# Patient Record
Sex: MALE | Race: WHITE | URBAN - METROPOLITAN AREA
[De-identification: names, ages, dates, MRNs, and addresses within clinical notes are randomized per-mention and may not be internally consistent; named-entity substitution may affect disease eponyms.]

---

## 2021-10-01 ENCOUNTER — TELEPHONE (OUTPATIENT)
Dept: FAMILY MEDICINE CLINIC | Age: 66
End: 2021-10-01

## 2021-10-01 NOTE — TELEPHONE ENCOUNTER
----- Message from Nel Jasiel sent at 9/30/2021 10:08 AM EDT -----  Subject: Message to Provider    QUESTIONS  Information for Provider? Patient has scheduled a new patient appt for   10/18 at 2:30. He would like to have bloodwork done before the appt so   that it can be discussed. Please call patient to advise.   ---------------------------------------------------------------------------  --------------  CALL BACK INFO  What is the best way for the office to contact you? OK to leave message on   voicemail  Preferred Call Back Phone Number? [phone]  ---------------------------------------------------------------------------  --------------  SCRIPT ANSWERS  Relationship to Patient?  Self

## 2021-10-04 DIAGNOSIS — Z00.00 PREVENTATIVE HEALTH CARE: Primary | ICD-10-CM

## 2021-10-04 DIAGNOSIS — Z13.29 SCREENING FOR THYROID DISORDER: ICD-10-CM

## 2021-10-04 DIAGNOSIS — Z12.5 SCREENING FOR MALIGNANT NEOPLASM OF PROSTATE: ICD-10-CM

## 2021-10-18 ENCOUNTER — OFFICE VISIT (OUTPATIENT)
Dept: FAMILY MEDICINE CLINIC | Age: 66
End: 2021-10-18
Payer: COMMERCIAL

## 2021-10-18 VITALS
WEIGHT: 226.8 LBS | BODY MASS INDEX: 30.06 KG/M2 | TEMPERATURE: 98.6 F | HEIGHT: 73 IN | HEART RATE: 82 BPM | SYSTOLIC BLOOD PRESSURE: 152 MMHG | OXYGEN SATURATION: 97 % | DIASTOLIC BLOOD PRESSURE: 102 MMHG

## 2021-10-18 DIAGNOSIS — E78.2 MIXED HYPERLIPIDEMIA: ICD-10-CM

## 2021-10-18 DIAGNOSIS — Z00.00 PREVENTATIVE HEALTH CARE: Primary | ICD-10-CM

## 2021-10-18 DIAGNOSIS — Z23 NEED FOR VACCINATION: ICD-10-CM

## 2021-10-18 DIAGNOSIS — I10 ESSENTIAL HYPERTENSION: ICD-10-CM

## 2021-10-18 PROCEDURE — 90756 CCIIV4 VACC ABX FREE IM: CPT | Performed by: NURSE PRACTITIONER

## 2021-10-18 PROCEDURE — 99386 PREV VISIT NEW AGE 40-64: CPT | Performed by: NURSE PRACTITIONER

## 2021-10-18 PROCEDURE — 90471 IMMUNIZATION ADMIN: CPT | Performed by: NURSE PRACTITIONER

## 2021-10-18 RX ORDER — LOSARTAN POTASSIUM 25 MG/1
25 TABLET ORAL DAILY
Qty: 30 TABLET | Refills: 0 | Status: SHIPPED | OUTPATIENT
Start: 2021-10-18 | End: 2021-11-18 | Stop reason: SDUPTHER

## 2021-10-18 ASSESSMENT — PATIENT HEALTH QUESTIONNAIRE - PHQ9
SUM OF ALL RESPONSES TO PHQ QUESTIONS 1-9: 0
SUM OF ALL RESPONSES TO PHQ QUESTIONS 1-9: 0
SUM OF ALL RESPONSES TO PHQ9 QUESTIONS 1 & 2: 0
SUM OF ALL RESPONSES TO PHQ QUESTIONS 1-9: 0
1. LITTLE INTEREST OR PLEASURE IN DOING THINGS: 0
2. FEELING DOWN, DEPRESSED OR HOPELESS: 0

## 2021-10-18 NOTE — PROGRESS NOTES
Elder Mckinley  : 1955  Encounter date: 10/18/2021    This jen 61 y.o. male who presents with  Chief Complaint   Patient presents with    New Patient     establish care       History of present illness:    HPI   History and Physical      Elder Mckinley  YOB: 1955    Date of Service:  10/18/2021    Chief Complaint:   Elder Mckinley is a 61 y.o. male who  presents for physical examination. HPI: PT is 61year old male, new to practice, labs completed 10/12/21 shows low HDL- 29 and high LDL- 142. Pt with history of essential hypertension has taken lisinopril in past.  Reports has taken medication in past with hypotension. Reports recently much more stressed. Wt Readings from Last 3 Encounters:   10/18/21 226 lb 12.8 oz (102.9 kg)     BP Readings from Last 3 Encounters:   10/18/21 (!) 152/102       Patient Active Problem List   Diagnosis    Mixed hyperlipidemia       No Known Allergies  Outpatient Medications Marked as Taking for the 10/18/21 encounter (Office Visit) with LACHO Childress NP   Medication Sig Dispense Refill    losartan (COZAAR) 25 MG tablet Take 1 tablet by mouth daily 30 tablet 0       History reviewed. No pertinent past medical history. History reviewed. No pertinent surgical history.   Family History   Problem Relation Age of Onset    Diabetes Mother      Social History     Socioeconomic History    Marital status:      Spouse name: Not on file    Number of children: Not on file    Years of education: Not on file    Highest education level: Not on file   Occupational History    Not on file   Tobacco Use    Smoking status: Never Smoker    Smokeless tobacco: Never Used   Substance and Sexual Activity    Alcohol use: Never    Drug use: Never    Sexual activity: Not on file   Other Topics Concern    Not on file   Social History Narrative    Not on file     Social Determinants of Health     Financial Resource Strain:     Difficulty of Paying Living Expenses:    Food Insecurity:     Worried About [facility] in the Last Year:     [address] in the Last Year:    Transportation Needs:     Lack of Transportation (Medical):  Lack of Transportation (Non-Medical):    Physical Activity:     Days of Exercise per Week:     Minutes of Exercise per Session:    Stress:     Feeling of Stress :    Social Connections:     Frequency of Communication with Friends and Family:     Frequency of Social Gatherings with Friends and Family:     Attends [employer] Services:     Active Member of Clubs or Organizations:     Attends Club or Organization Meetings:     Marital Status:    Intimate Partner Violence:     Fear of Current or Ex-Partner:     Emotionally Abused:     Physically Abused:     Sexually Abused:        Self-testicular exams: Yes  Sexual activity: none   Last eye exam: 2018, recommended  Exercise: no regular exercise    Review of Systems:  A comprehensive review of systems was negative except for what was noted in the HPI. Physical Exam:   Vitals:    10/18/21 1424 10/18/21 1456   BP: (!) 152/92 (!) 152/102   Site: Left Upper Arm    Position: Sitting    Cuff Size: Large Adult    Pulse: 82    Temp: 98.6 °F (37 °C)    SpO2: 97%    Weight: 226 lb 12.8 oz (102.9 kg)    Height: 6' 1\" (1.854 m)      Body mass index is 29.92 kg/m². Constitutional: He is oriented to person, place, and time. He appears well-developed and well-nourished. No distress. HEENT:   Head: Normocephalic and atraumatic. Right Ear: Tympanic membrane, external ear and ear canal normal.   Left Ear: Tympanic membrane, external ear and ear canal normal.   Mouth/Throat: Oropharynx is clear and moist and mucous membranes are normal. No oropharyngeal exudate or posterior oropharyngeal erythema. He has no cervical adenopathy. Eyes: Conjunctivae and extraocular motions are normal. Pupils are equal, round, and reactive to light. Neck:  Supple.  No JVD present. Carotid bruit is not present. No mass and no thyromegaly present. Cardiovascular: Normal rate, regular rhythm, normal heart sounds and intact distal pulses. Exam reveals no gallop and no friction rub. No murmur heard. Pulmonary/Chest: Effort normal and breath sounds normal. No respiratory distress. He has no wheezes, rhonchi or rales. Abdominal: Soft, non-tender. Bowel sounds and aorta are normal. There is no organomegaly, mass or bruit. Genitourinary:  rectal not indicated by age criteria and lack of symptoms. Musculoskeletal: Normal range of motion, no synovitis. He exhibits no edema. R ankle chronic pain, OA   Neurological: He is alert and oriented to person, place, and time. He has normal reflexes. No cranial nerve deficit. Coordination normal.   Skin: Skin is warm, dry and intact. No suspicious lesions are noted. Psychiatric: He has a normal mood and affect.  His speech is normal and behavior is normal. Judgment, cognition and memory are normal.     Preventive Care:  Health Maintenance   Topic Date Due    DTaP/Tdap/Td vaccine (1 - Tdap) Never done    Colon cancer screen colonoscopy  Never done    Potassium monitoring  10/12/2022    Creatinine monitoring  10/12/2022    Lipid screen  10/12/2026    Flu vaccine  Completed    Shingles Vaccine  Completed    COVID-19 Vaccine  Completed    Hepatitis C screen  Completed    HIV screen  Completed    Hepatitis A vaccine  Aged Out    Hepatitis B vaccine  Aged Out    Hib vaccine  Aged Out    Meningococcal (ACWY) vaccine  Aged Out    Pneumococcal 0-64 years Vaccine  Aged [employer] plan of care for Yokasta Mahmood        10/18/2021           Preventive Measures Status       Recommendations for screening   Prostate Cancer Screen  Lab Results   Component Value Date    PSA 1.0 10/12/2021      Repeat yearly    Colon Cancer Screen  Last colonoscopy: 09/09/2007 Test is due , pt will see which gastro is in network   Diabetes Screen  Glucose (mg/dL)   Date Value   10/12/2021 92    Repeat yearly   Cholesterol Screen  Lab Results   Component Value Date    CHOL 199 10/12/2021    TRIG 97 10/12/2021    HDL 39 (L) 10/12/2021    LDLCALC 142 (H) 10/12/2021    Repeat every 6 months   Aspirin for Cardiovascular Prevention   No Not indicated   Weight: Body mass index is 29.92 kg/m². 6' 1\" (1.854 m)226 lb 12.8 oz (102.9 kg)  Your BMI is 25 or greater, which indicates that you are overweight   Living Will: No recommended    Recommended Immunizations    Immunization History   Administered Date(s) Administered    COVID-19, Paul Peter, PF, 30mcg/0.3mL 03/14/2021, 04/03/2021    Influenza Virus Vaccine 09/16/2015, 09/25/2017, 10/30/2018, 10/31/2019, 10/24/2020    Influenza, MDCK Quadv, with preserv IM (Flucelvax 2 yrs and older) 10/18/2021    Zoster Recombinant (Shingrix) 10/30/2018, 02/12/2019    Influenza vaccine:  recommended every fall         Other Recommendations ·   Follow up in this office every 12 months for re-evaluation of your chronic medical issues             Assessment/Plan:  Adarsh Chaz was seen today for new patient. Diagnoses and all orders for this visit:    Preventative health care    Mixed hyperlipidemia    Need for vaccination  -     INFLUENZA, MDCK QUADV, 2 YRS AND OLDER, IM, MDV, 0.5ML (FLUCELVAX QUADV)    Essential hypertension  -     losartan (COZAAR) 25 MG tablet; Take 1 tablet by mouth daily            No current outpatient medications on file prior to visit. No current facility-administered medications on file prior to visit. No Known Allergies  History reviewed. No pertinent past medical history. History reviewed. No pertinent surgical history.    Family History   Problem Relation Age of Onset    Diabetes Mother       Social History     Tobacco Use    Smoking status: Never Smoker    Smokeless tobacco: Never Used   Substance Use Topics    Alcohol use: Never        Review of Systems    Objective:    BP (!) 152/102 Pulse 82   Temp 98.6 °F (37 °C)   Ht 6' 1\" (1.854 m)   Wt 226 lb 12.8 oz (102.9 kg)   SpO2 97%   BMI 29.92 kg/m²   Weight: 226 lb 12.8 oz (102.9 kg)     BP Readings from Last 3 Encounters:   10/18/21 (!) 152/102     Wt Readings from Last 3 Encounters:   10/18/21 226 lb 12.8 oz (102.9 kg)     BMI Readings from Last 3 Encounters:   10/18/21 29.92 kg/m²       Physical Exam    Assessment/Plan    1. Preventative health care  Advised routine dental and vision  Advised increased exercise and weight loss  Advised colonoscopy      2. Mixed hyperlipidemia  Discussed low saturated fat foods  Recheck in 6 months    3. Need for vaccination  Adminstered  - INFLUENZA, MDCK QUADV, 2 YRS AND OLDER, IM, MDV, 0.5ML (FLUCELVAX QUADV)    4. Essential hypertension  Advised daily monitoring and recording  Low salt diet  Stress reducing tactics  - losartan (COZAAR) 25 MG tablet; Take 1 tablet by mouth daily  Dispense: 30 tablet; Refill: 0      Return in about 1 month (around 11/18/2021) for hypertension re-check, medication re-check. This dictation was generated by voice recognition computer software. Although all attempts are made to edit the dictation for accuracy, there may be errors in the transcription that are not intended.

## 2021-11-18 ENCOUNTER — OFFICE VISIT (OUTPATIENT)
Dept: FAMILY MEDICINE CLINIC | Age: 66
End: 2021-11-18
Payer: COMMERCIAL

## 2021-11-18 VITALS
SYSTOLIC BLOOD PRESSURE: 130 MMHG | DIASTOLIC BLOOD PRESSURE: 82 MMHG | OXYGEN SATURATION: 98 % | HEART RATE: 67 BPM | WEIGHT: 226 LBS | BODY MASS INDEX: 29.82 KG/M2 | TEMPERATURE: 98.1 F

## 2021-11-18 DIAGNOSIS — E78.2 MIXED HYPERLIPIDEMIA: ICD-10-CM

## 2021-11-18 DIAGNOSIS — I10 ESSENTIAL HYPERTENSION: Primary | ICD-10-CM

## 2021-11-18 PROCEDURE — 99214 OFFICE O/P EST MOD 30 MIN: CPT | Performed by: NURSE PRACTITIONER

## 2021-11-18 RX ORDER — LOSARTAN POTASSIUM 25 MG/1
25 TABLET ORAL DAILY
Qty: 90 TABLET | Refills: 1 | Status: SHIPPED | OUTPATIENT
Start: 2021-11-18 | End: 2022-05-17

## 2021-11-18 NOTE — PROGRESS NOTES
Anderson Rodriguez  : 1955  Encounter date: 2021    This jen 61 y.o. male who presents with  Chief Complaint   Patient presents with    1 Month Follow-Up     BP medication       History of present illness:    HPI Pt is 61year old male for follow up on hypertension, provided BP measurement log for last month. PT denies side effects of medications. Well controlled. Reviewed recent labs. Elevated LDL and low HDL, reports has started taking fish oil. Will recheck in 6 months to determine medication management. No current outpatient medications on file prior to visit. No current facility-administered medications on file prior to visit. No Known Allergies  History reviewed. No pertinent past medical history. History reviewed. No pertinent surgical history. Family History   Problem Relation Age of Onset    Diabetes Mother       Social History     Tobacco Use    Smoking status: Never Smoker    Smokeless tobacco: Never Used   Substance Use Topics    Alcohol use: Never        Review of Systems    Objective:    /82 (Site: Left Upper Arm, Position: Sitting, Cuff Size: Medium Adult)   Pulse 67   Temp 98.1 °F (36.7 °C)   Wt 226 lb (102.5 kg)   SpO2 98%   BMI 29.82 kg/m²   Weight: 226 lb (102.5 kg)     BP Readings from Last 3 Encounters:   21 130/82   10/18/21 (!) 152/102     Wt Readings from Last 3 Encounters:   21 226 lb (102.5 kg)   10/18/21 226 lb 12.8 oz (102.9 kg)     BMI Readings from Last 3 Encounters:   21 29.82 kg/m²   10/18/21 29.92 kg/m²       Physical Exam  Vitals reviewed. Constitutional:       Appearance: Normal appearance. He is well-developed. Cardiovascular:      Rate and Rhythm: Normal rate and regular rhythm. Pulses: Normal pulses. Heart sounds: Normal heart sounds. No murmur heard. Pulmonary:      Effort: Pulmonary effort is normal.      Breath sounds: Normal breath sounds. Musculoskeletal:      Right lower leg: No edema. Left lower leg: No edema. Skin:     General: Skin is warm and dry. Neurological:      Mental Status: He is alert and oriented to person, place, and time. Psychiatric:         Mood and Affect: Mood normal.         Assessment/Plan    1. Essential hypertension  Controlled  Advised continued monitoring  Low salt diet  - losartan (COZAAR) 25 MG tablet; Take 1 tablet by mouth daily  Dispense: 90 tablet; Refill: 1    2. Mixed hyperlipidemia  Uncontrolled  Continue with fish oil  Advised low saturated fat diet  Recheck to determine medications      Return in about 6 months (around 5/18/2022) for lab review, medication re-check. This dictation was generated by voice recognition computer software. Although all attempts are made to edit the dictation for accuracy, there may be errors in the transcription that are not intended.

## 2022-05-17 DIAGNOSIS — I10 ESSENTIAL HYPERTENSION: ICD-10-CM

## 2022-05-17 RX ORDER — LOSARTAN POTASSIUM 25 MG/1
TABLET ORAL
Qty: 90 TABLET | Refills: 1 | Status: SHIPPED | OUTPATIENT
Start: 2022-05-17

## 2022-05-18 ENCOUNTER — OFFICE VISIT (OUTPATIENT)
Dept: FAMILY MEDICINE CLINIC | Age: 67
End: 2022-05-18
Payer: COMMERCIAL

## 2022-05-18 VITALS
BODY MASS INDEX: 30.15 KG/M2 | OXYGEN SATURATION: 98 % | SYSTOLIC BLOOD PRESSURE: 134 MMHG | HEART RATE: 70 BPM | WEIGHT: 228.5 LBS | DIASTOLIC BLOOD PRESSURE: 82 MMHG | TEMPERATURE: 98.1 F

## 2022-05-18 DIAGNOSIS — I10 ESSENTIAL HYPERTENSION: Primary | ICD-10-CM

## 2022-05-18 DIAGNOSIS — E78.2 MIXED HYPERLIPIDEMIA: ICD-10-CM

## 2022-05-18 PROCEDURE — 99214 OFFICE O/P EST MOD 30 MIN: CPT | Performed by: NURSE PRACTITIONER

## 2022-05-18 SDOH — ECONOMIC STABILITY: FOOD INSECURITY: WITHIN THE PAST 12 MONTHS, THE FOOD YOU BOUGHT JUST DIDN'T LAST AND YOU DIDN'T HAVE MONEY TO GET MORE.: NEVER TRUE

## 2022-05-18 SDOH — ECONOMIC STABILITY: FOOD INSECURITY: WITHIN THE PAST 12 MONTHS, YOU WORRIED THAT YOUR FOOD WOULD RUN OUT BEFORE YOU GOT MONEY TO BUY MORE.: NEVER TRUE

## 2022-05-18 ASSESSMENT — PATIENT HEALTH QUESTIONNAIRE - PHQ9
SUM OF ALL RESPONSES TO PHQ QUESTIONS 1-9: 0
SUM OF ALL RESPONSES TO PHQ QUESTIONS 1-9: 0
2. FEELING DOWN, DEPRESSED OR HOPELESS: 0
SUM OF ALL RESPONSES TO PHQ9 QUESTIONS 1 & 2: 0
SUM OF ALL RESPONSES TO PHQ QUESTIONS 1-9: 0
1. LITTLE INTEREST OR PLEASURE IN DOING THINGS: 0
SUM OF ALL RESPONSES TO PHQ QUESTIONS 1-9: 0

## 2022-05-18 ASSESSMENT — SOCIAL DETERMINANTS OF HEALTH (SDOH): HOW HARD IS IT FOR YOU TO PAY FOR THE VERY BASICS LIKE FOOD, HOUSING, MEDICAL CARE, AND HEATING?: NOT HARD AT ALL

## 2022-05-18 NOTE — PROGRESS NOTES
Braulio Almonte  : 1955  Encounter date: 2022    This jen 61 y.o. male who presents with  Chief Complaint   Patient presents with    Hypertension       History of present illness:    HPI Pt is 61year old male for hypertension follow up and mixed hyperlipidemia. Pt has not completed labs, recent showed LDL- 142 and HDL- 39. Pt reports BP well controlled. Will be obtaining Medicare insurance in September, need to have AWV and health maintenance completed. No new concerns. Current Outpatient Medications on File Prior to Visit   Medication Sig Dispense Refill    losartan (COZAAR) 25 MG tablet TAKE 1 TABLET BY MOUTH EVERY DAY 90 tablet 1     No current facility-administered medications on file prior to visit. No Known Allergies  History reviewed. No pertinent past medical history. History reviewed. No pertinent surgical history. Family History   Problem Relation Age of Onset    Diabetes Mother       Social History     Tobacco Use    Smoking status: Never Smoker    Smokeless tobacco: Never Used   Substance Use Topics    Alcohol use: Never        Review of Systems    Objective:    /82 (Site: Right Upper Arm, Position: Sitting, Cuff Size: Medium Adult)   Pulse 70   Temp 98.1 °F (36.7 °C) (Infrared)   Wt 228 lb 8 oz (103.6 kg)   SpO2 98%   BMI 30.15 kg/m²   Weight: 228 lb 8 oz (103.6 kg)     BP Readings from Last 3 Encounters:   22 134/82   21 130/82   10/18/21 (!) 152/102     Wt Readings from Last 3 Encounters:   22 228 lb 8 oz (103.6 kg)   21 226 lb (102.5 kg)   10/18/21 226 lb 12.8 oz (102.9 kg)     BMI Readings from Last 3 Encounters:   22 30.15 kg/m²   21 29.82 kg/m²   10/18/21 29.92 kg/m²       Physical Exam  Vitals reviewed. Constitutional:       Appearance: Normal appearance. He is well-developed. He is obese. Cardiovascular:      Rate and Rhythm: Normal rate and regular rhythm. Pulses: Normal pulses.       Heart sounds: Normal heart sounds. No murmur heard. Pulmonary:      Effort: Pulmonary effort is normal.      Breath sounds: Normal breath sounds. Musculoskeletal:      Right lower leg: No edema. Left lower leg: No edema. Skin:     General: Skin is warm and dry. Neurological:      Mental Status: He is alert and oriented to person, place, and time. Psychiatric:         Mood and Affect: Mood normal.         Assessment/Plan    1. Essential hypertension  controlled    2. Mixed hyperlipidemia  Uncontrolled  Advised low saturated fat diet, exercise  Will discuss medication management following completion of labs      Return in about 5 months (around 10/18/2022) for annual check up. This dictation was generated by voice recognition computer software. Although all attempts are made to edit the dictation for accuracy, there may be errors in the transcription that are not intended.

## 2022-06-01 DIAGNOSIS — E78.2 MIXED HYPERLIPIDEMIA: ICD-10-CM

## 2022-06-01 DIAGNOSIS — I10 ESSENTIAL HYPERTENSION: ICD-10-CM

## 2022-06-01 LAB
A/G RATIO: 1.5 (ref 1.1–2.2)
ALBUMIN SERPL-MCNC: 4.5 G/DL (ref 3.4–5)
ALP BLD-CCNC: 58 U/L (ref 40–129)
ALT SERPL-CCNC: 19 U/L (ref 10–40)
ANION GAP SERPL CALCULATED.3IONS-SCNC: 13 MMOL/L (ref 3–16)
AST SERPL-CCNC: 17 U/L (ref 15–37)
BILIRUB SERPL-MCNC: 0.7 MG/DL (ref 0–1)
BUN BLDV-MCNC: 22 MG/DL (ref 7–20)
CALCIUM SERPL-MCNC: 9.6 MG/DL (ref 8.3–10.6)
CHLORIDE BLD-SCNC: 103 MMOL/L (ref 99–110)
CHOLESTEROL, FASTING: 212 MG/DL (ref 0–199)
CO2: 23 MMOL/L (ref 21–32)
CREAT SERPL-MCNC: 1.1 MG/DL (ref 0.8–1.3)
CREATININE URINE: 226.8 MG/DL (ref 39–259)
GFR AFRICAN AMERICAN: >60
GFR NON-AFRICAN AMERICAN: >60
GLUCOSE FASTING: 97 MG/DL (ref 70–99)
HDLC SERPL-MCNC: 32 MG/DL (ref 40–60)
LDL CHOLESTEROL CALCULATED: 153 MG/DL
MICROALBUMIN UR-MCNC: <1.2 MG/DL
MICROALBUMIN/CREAT UR-RTO: NORMAL MG/G (ref 0–30)
POTASSIUM SERPL-SCNC: 4.6 MMOL/L (ref 3.5–5.1)
SODIUM BLD-SCNC: 139 MMOL/L (ref 136–145)
TOTAL PROTEIN: 7.6 G/DL (ref 6.4–8.2)
TRIGLYCERIDE, FASTING: 135 MG/DL (ref 0–150)
VLDLC SERPL CALC-MCNC: 27 MG/DL

## 2022-06-02 DIAGNOSIS — E78.2 MIXED HYPERLIPIDEMIA: Primary | ICD-10-CM

## 2022-06-02 RX ORDER — ATORVASTATIN CALCIUM 20 MG/1
20 TABLET, FILM COATED ORAL DAILY
Qty: 90 TABLET | Refills: 1 | Status: SHIPPED | OUTPATIENT
Start: 2022-06-02

## 2022-11-21 ENCOUNTER — OFFICE VISIT (OUTPATIENT)
Dept: FAMILY MEDICINE CLINIC | Age: 67
End: 2022-11-21
Payer: COMMERCIAL

## 2022-11-21 VITALS
OXYGEN SATURATION: 96 % | HEART RATE: 78 BPM | TEMPERATURE: 98 F | WEIGHT: 218 LBS | BODY MASS INDEX: 29.53 KG/M2 | SYSTOLIC BLOOD PRESSURE: 138 MMHG | HEIGHT: 72 IN | DIASTOLIC BLOOD PRESSURE: 94 MMHG

## 2022-11-21 DIAGNOSIS — Z00.00 ENCOUNTER FOR INITIAL ANNUAL WELLNESS VISIT (AWV) IN MEDICARE PATIENT: Primary | ICD-10-CM

## 2022-11-21 DIAGNOSIS — E78.2 MIXED HYPERLIPIDEMIA: ICD-10-CM

## 2022-11-21 DIAGNOSIS — Z23 NEED FOR VACCINATION: ICD-10-CM

## 2022-11-21 DIAGNOSIS — I95.1 ORTHOSTATIC HYPOTENSION: ICD-10-CM

## 2022-11-21 DIAGNOSIS — I10 ESSENTIAL HYPERTENSION: ICD-10-CM

## 2022-11-21 DIAGNOSIS — R55 SYNCOPE, UNSPECIFIED SYNCOPE TYPE: ICD-10-CM

## 2022-11-21 DIAGNOSIS — Z12.11 SCREEN FOR COLON CANCER: ICD-10-CM

## 2022-11-21 PROCEDURE — G0438 PPPS, INITIAL VISIT: HCPCS | Performed by: NURSE PRACTITIONER

## 2022-11-21 PROCEDURE — 90732 PPSV23 VACC 2 YRS+ SUBQ/IM: CPT | Performed by: NURSE PRACTITIONER

## 2022-11-21 PROCEDURE — 1123F ACP DISCUSS/DSCN MKR DOCD: CPT | Performed by: NURSE PRACTITIONER

## 2022-11-21 PROCEDURE — 3075F SYST BP GE 130 - 139MM HG: CPT | Performed by: NURSE PRACTITIONER

## 2022-11-21 PROCEDURE — 90471 IMMUNIZATION ADMIN: CPT | Performed by: NURSE PRACTITIONER

## 2022-11-21 PROCEDURE — 3079F DIAST BP 80-89 MM HG: CPT | Performed by: NURSE PRACTITIONER

## 2022-11-21 RX ORDER — LOSARTAN POTASSIUM 25 MG/1
TABLET ORAL
Qty: 90 TABLET | Refills: 1 | Status: SHIPPED | OUTPATIENT
Start: 2022-11-21

## 2022-11-21 RX ORDER — ATORVASTATIN CALCIUM 20 MG/1
20 TABLET, FILM COATED ORAL DAILY
Qty: 90 TABLET | Refills: 1 | Status: SHIPPED | OUTPATIENT
Start: 2022-11-21

## 2022-11-21 ASSESSMENT — PATIENT HEALTH QUESTIONNAIRE - PHQ9
SUM OF ALL RESPONSES TO PHQ QUESTIONS 1-9: 0
SUM OF ALL RESPONSES TO PHQ9 QUESTIONS 1 & 2: 0
SUM OF ALL RESPONSES TO PHQ QUESTIONS 1-9: 0
SUM OF ALL RESPONSES TO PHQ QUESTIONS 1-9: 0
1. LITTLE INTEREST OR PLEASURE IN DOING THINGS: 0
SUM OF ALL RESPONSES TO PHQ QUESTIONS 1-9: 0
2. FEELING DOWN, DEPRESSED OR HOPELESS: 0

## 2022-11-21 NOTE — PROGRESS NOTES
[facility] VISIT    Patient is here for their Medicare Annual Wellness Visit. PT reports incident of syncope episode following positional change orthostatic hypotension, pt reports this has occurred multiple times. Pt has not had cardiac work-up, no significant history. Pt also reports being prescribed Lipitor for hyperlipidemia, but never started medication, new order sent over and will recheck labs. Last eye exam: 4/2022  Last dental exam: 7/2022  Exercise: lots of walking  Diet: well balanced    How would you rate your overall health? : Very good        Fall Risk 11/21/2022   2 or more falls in past year? no   Fall with injury in past year? no       PHQ Scores 11/21/2022 5/18/2022 10/18/2021   PHQ2 Score 0 0 0   PHQ9 Score 0 0 0       Do you always wear a seat belt in the car?: Yes      Have you noted any problems with hearing?: No  Have you noted any vision problems?: No  Do you have concerns about your sexual health?: no  In the past month how much has pain been an issue for you?:  A little bit, has rheumatoid arthritis   In the past month have you had issues with anxiety, loneliness, irritability or fatigue:  Not at all    Do you take opioid medications even sometimes? No (if using assess risk and whether other treatments would be beneficial)    Living Will: Yes,   Copy requested      Healthcare Decision Maker:    Primary Decision Maker: Lionel Gomez - Spouse - [phone]  Click here to complete Healthcare Decision Makers including selection of the Healthcare Decision Maker Relationship (ie \"Primary\"). Today we documented Decision Maker(s) consistent with Legal Next of Kin hierarchy. Who lives at home with you: Spouse Victor Hugo Car you have any pets? 3 cats and a parrot  Do you have any services coming to your home (meals on wheels, home health, etc) ? : no      Do you need help with:  Using the phone:  No  Bathing: No  Dressing:  No  Toileting: No  Transportation:  No  Shopping: No  Preparing meals: No  Housework/Laundry: No  Medications: No  Money management: No    Does your home have:  Unsecured throw rugs: No  Grab bars in bathroom: No  Walk in shower: No  Seat in shower: No  Lit pathways for night (nightlights): Yes    Memory:  Have you or anyone close to you expressed concerns about your memory: No    Knows:  Month: Yes  Day: Yes  Year: Yes  Day of Week: Yes  Able to Recall (tree, fork, ball) : Yes    Patient history:   Patient's medications, allergies, past medical, surgical, social and family histories were reviewed and updated in the EHR under History. Social History     Substance and Sexual Activity   Alcohol Use Never       Social History     Substance and Sexual Activity   Drug Use Never       Social History     Tobacco Use   Smoking Status Never   Smokeless Tobacco Never           Care Team:  Patient's list of care team members was updated in EHR under the Snap Shot. Immunizations: Reviewed with patient. Due for covid booster  [id number]  TDAP    Health Maintenance Due   Topic Date Due    DTaP/Tdap/Td vaccine (1 - Tdap) Never done    Colorectal Cancer Screen  Never done    COVID-19 Vaccine (3 - Booster for Pfizer series) 05/29/2021       CHRONIC CONDITIONS / ACUTE COMPLAINTS   Essential hypertension  Mixed hyperlipidemia      Physical Exam:    Body mass index is 29.57 kg/m². Vitals:    11/21/22 1020 11/21/22 1056   BP: (!) 140/100 (!) 138/94   Site: Left Upper Arm    Position: Sitting    Cuff Size: Medium Adult    Pulse: 78    Temp: 98 °F (36.7 °C)    TempSrc: Infrared    SpO2: 96%    Weight: 218 lb (98.9 kg)    Height: 6' (1.829 m)      Wt Readings from Last 3 Encounters:   11/21/22 218 lb (98.9 kg)   05/18/22 228 lb 8 oz (103.6 kg)   11/18/21 226 lb (102.5 kg)       GENERAL:Alert and oriented x 4 NAD, no acute distress, well hydrated, well developed.   LUNG:clear to auscultation bilaterally with normal respiratory effort  CV: Normal heart sounds, regular rate and rhythm without murmurs  EXTREMETY: no loss of hair, no edema, normal pedal pulses bilaterally    Was the timed get up and go unsteady or longer than 20 seconds: No    Vision Screen for Initial Exam: no    EKG for Initial Exam at 74 (): no    AAA U/S screen for men 65-75 who smoked (): not applicable    Assessment/Plan:    Miguel Angel Yanez was seen today for medicare awv. Diagnoses and all orders for this visit:    Encounter for initial annual wellness visit (AWV) in Medicare patient  Advised routine dental and vision  Increased exercise, healthy diet and weight loss  Advised living will    Screen for colon cancer  -     [facility] - Trinidad Oropeza MD, Gastroenterology, [facility]    Need for vaccination  -     Pneumococcal, PPSV23, PNEUMOVAX 23, (age 2 yrs+), SC/IM  Administered    Orthostatic hypotension  -     Toy Alonso MD, Cardiology (Structural)[facility]  Advised hydration    Syncope, unspecified syncope type  -     Toy Alonso MD, Cardiology (Structural), [facility]          Return in about 6 months (around 5/21/2023) for hypertension re-check.

## 2022-11-30 NOTE — PROGRESS NOTES
[address]  12/15/22  Referring: Dr. Miller ref. provider found    REASON FOR CONSULT/CHIEF COMPLAINT/HPI     Reason for visit/ Chief complaint     Chief Complaint   Patient presents with    New Patient    Loss of Consciousness     Back in August when he was getting out of the hot tub       HPI Sara Gonzales is a 74 y.o. new patient here by referral from Nadine Kelly NP for several syncopal episodes. Pt also was diagnosed in past with HLD and precribed medication but never took. New script was sent over by PCP in November. Pt denies smoking, drinking alcohol or use of drugs. Mother CHF, sister afib, possibly with 2 older brothers. Today he complains of orthostatic hypotension x 1 episode with syncope. Heart and lungs sounds are good on auscultation today. He was in hot tub in August and stood up and bent over to  chlorine, came back up and passed out. He had COVID at the time and was outside in a hot tub. No CP, SOB, palpitation or dizziness complaints today. Patient is adherent with medications and is tolerating them well without side effects     HISTORY/ALLERGIES/ROS     MedHx:  has a past medical history of Hypertension. SurgHx:  has no past surgical history on file. SocHx:  reports that he has never smoked. He has never used smokeless tobacco. He reports that he does not drink alcohol and does not use drugs. FamHx: No family history of premature coronary artery disease, sudden death, or aneurysm  Allergies: Patient has no known allergies. MEDICATIONS      Prior to Admission medications    Medication Sig Start Date End Date Taking?  Authorizing Provider   atorvastatin (LIPITOR) 20 MG tablet Take 1 tablet by mouth daily 11/21/22  Yes LACHO Kenny NP   losartan (COZAAR) 25 MG tablet TAKE 1 TABLET BY MOUTH EVERY DAY 11/21/22  Yes LACHO Kenny NP       PHYSICAL EXAM        Vitals:    12/15/22 0908   BP: (!) 160/85   Pulse: 92 Resp:    SpO2:     Weight: 222 lb (100.7 kg)     Gen Alert, cooperative, no distress Heart  Regular rate and rhythm, no murmur   Head Normocephalic, atraumatic, no abnormalities Abd  Soft, NT, +BS, no mass, no OM   Eyes PERRLA, conj/corn clear Ext  Ext nl, AT, no C/C, no edema   Nose Nares normal, no drain age, Non-tender Pulse 2+ and symmetric   Throat Lips, mucosa, tongue normal Skin Color/text/turg nl, no rash/lesions   Neck S/S, TM, NT, no bruit Psych Nl mood and affect   Lung CTA-B, unlabored, no DTP     Ch wall NT, no deform       LABS and Imaging     Relevant and available CV data reviewed    EKG personally interpreted: 12/15/22 NSR nml ECG    LIPID 6/1/22   HDL32 [id number]     Outside/Care everywhere records Reviewed  Labs Reviewed  Prior Imaging, ekg, cath, echo reviewed when available  Medications reviewed  Old Notes reviewed  ASSESSMENT AND PLAN     1. Syncope, vasovagal  Positional change x 1   Only occurred once, and had several triggers  Plan:  Avoid triggers for syncope, stay hydrated, avoid hot tub when sick. EKG normal, exam normal, no further cardiac workup needed at this time. 2.HLD   Prescribed Lipitor but never filled it   Lipitor restrarted 11/8/22-  20 mg daily   Plan: Continue Lipitor     3. HTN   BP (!) 160/85 (Site: Right Upper Arm, Position: Standing)   Pulse 92   Resp 14   Ht 6' (1.829 m)   Wt 222 lb (100.7 kg)   SpO2 97%   BMI 30.11 kg/m²   Losartan 25mg daily   Upon checking orthostatics today his BP went up. Plan: Continue to monitor. Continue Losartan     4. First degree AV block  Plan: reassured patient that no further action is needed at this time. 1st degree AV block is NOT an indication for a pacemaker not an indication for further workup in the absence of concerning symptoms. Low threshold to do a monitor in the future if he starts having dizziness or other rhythm symptoms. Patient counseled on lifestyle modification, diet, and exercise.     Follow Up: as needed      Jeramie Raymond MD  Cardiologist [facility]    Scribe Attestation: This note was scribed in the presence of Dr. Randee Pineda by Hemanth Bello, ASHA. Physician Attestation  The scribe wrote this note in the presence of me Jeramie Raymond MD). The scribe may have prepopulated components of this note with my historical  intellectual property under my direct supervision. Any additions to this intellectual property were performed in my presence and at my direction. Furthermore, the content and accuracy of this note have been reviewed by me with edits by me as needed.   Jeramie Raymond MD 12/15/2022 1:41 PM

## 2022-12-14 PROBLEM — R55 SYNCOPE: Status: ACTIVE | Noted: 2022-12-14

## 2022-12-15 ENCOUNTER — OFFICE VISIT (OUTPATIENT)
Dept: CARDIOLOGY CLINIC | Age: 67
End: 2022-12-15

## 2022-12-15 VITALS
OXYGEN SATURATION: 97 % | DIASTOLIC BLOOD PRESSURE: 85 MMHG | HEIGHT: 72 IN | BODY MASS INDEX: 30.07 KG/M2 | SYSTOLIC BLOOD PRESSURE: 160 MMHG | WEIGHT: 222 LBS | HEART RATE: 92 BPM | RESPIRATION RATE: 14 BRPM

## 2022-12-15 DIAGNOSIS — I44.0 FIRST DEGREE AV BLOCK: ICD-10-CM

## 2022-12-15 DIAGNOSIS — R55 SYNCOPE, UNSPECIFIED SYNCOPE TYPE: Primary | ICD-10-CM

## 2022-12-15 DIAGNOSIS — I10 ESSENTIAL HYPERTENSION: ICD-10-CM

## 2022-12-15 DIAGNOSIS — E78.2 MIXED HYPERLIPIDEMIA: ICD-10-CM

## 2022-12-15 DIAGNOSIS — I95.1 ORTHOSTATIC HYPOTENSION: ICD-10-CM

## 2022-12-15 PROBLEM — I95.9 HYPOTENSION: Status: ACTIVE | Noted: 2022-12-15

## 2023-01-05 DIAGNOSIS — E78.2 MIXED HYPERLIPIDEMIA: ICD-10-CM

## 2023-01-05 LAB
A/G RATIO: 1.3 (ref 1.1–2.2)
ALBUMIN SERPL-MCNC: 3.8 G/DL (ref 3.4–5)
ALP BLD-CCNC: 53 U/L (ref 40–129)
ALT SERPL-CCNC: 16 U/L (ref 10–40)
ANION GAP SERPL CALCULATED.3IONS-SCNC: 13 MMOL/L (ref 3–16)
AST SERPL-CCNC: 16 U/L (ref 15–37)
BILIRUB SERPL-MCNC: 0.7 MG/DL (ref 0–1)
BUN BLDV-MCNC: 20 MG/DL (ref 7–20)
CALCIUM SERPL-MCNC: 9.6 MG/DL (ref 8.3–10.6)
CHLORIDE BLD-SCNC: 104 MMOL/L (ref 99–110)
CHOLESTEROL, FASTING: 134 MG/DL (ref 0–199)
CO2: 25 MMOL/L (ref 21–32)
CREAT SERPL-MCNC: 0.9 MG/DL (ref 0.8–1.3)
GFR SERPL CREATININE-BSD FRML MDRD: >60 ML/MIN/{1.73_M2}
GLUCOSE FASTING: 89 MG/DL (ref 70–99)
HDLC SERPL-MCNC: 37 MG/DL (ref 40–60)
LDL CHOLESTEROL CALCULATED: 82 MG/DL
POTASSIUM SERPL-SCNC: 4.3 MMOL/L (ref 3.5–5.1)
SODIUM BLD-SCNC: 142 MMOL/L (ref 136–145)
TOTAL PROTEIN: 6.8 G/DL (ref 6.4–8.2)
TRIGLYCERIDE, FASTING: 75 MG/DL (ref 0–150)
VLDLC SERPL CALC-MCNC: 15 MG/DL

## 2023-04-24 DIAGNOSIS — I10 ESSENTIAL HYPERTENSION: ICD-10-CM

## 2023-04-24 DIAGNOSIS — E78.2 MIXED HYPERLIPIDEMIA: ICD-10-CM

## 2023-04-24 RX ORDER — LOSARTAN POTASSIUM 25 MG/1
TABLET ORAL
Qty: 90 TABLET | Refills: 1 | Status: SHIPPED | OUTPATIENT
Start: 2023-04-24

## 2023-04-24 RX ORDER — ATORVASTATIN CALCIUM 20 MG/1
TABLET, FILM COATED ORAL
Qty: 90 TABLET | Refills: 1 | Status: SHIPPED | OUTPATIENT
Start: 2023-04-24

## 2023-05-28 SDOH — ECONOMIC STABILITY: FOOD INSECURITY: WITHIN THE PAST 12 MONTHS, THE FOOD YOU BOUGHT JUST DIDN'T LAST AND YOU DIDN'T HAVE MONEY TO GET MORE.: NEVER TRUE

## 2023-05-28 SDOH — ECONOMIC STABILITY: TRANSPORTATION INSECURITY
IN THE PAST 12 MONTHS, HAS LACK OF TRANSPORTATION KEPT YOU FROM MEETINGS, WORK, OR FROM GETTING THINGS NEEDED FOR DAILY LIVING?: NO

## 2023-05-28 SDOH — ECONOMIC STABILITY: INCOME INSECURITY: HOW HARD IS IT FOR YOU TO PAY FOR THE VERY BASICS LIKE FOOD, HOUSING, MEDICAL CARE, AND HEATING?: NOT HARD AT ALL

## 2023-05-28 SDOH — ECONOMIC STABILITY: FOOD INSECURITY: WITHIN THE PAST 12 MONTHS, YOU WORRIED THAT YOUR FOOD WOULD RUN OUT BEFORE YOU GOT MONEY TO BUY MORE.: NEVER TRUE

## 2023-05-28 SDOH — ECONOMIC STABILITY: HOUSING INSECURITY
IN THE LAST 12 MONTHS, WAS THERE A TIME WHEN YOU DID NOT HAVE A STEADY PLACE TO SLEEP OR SLEPT IN A SHELTER (INCLUDING NOW)?: NO

## 2023-05-31 ENCOUNTER — OFFICE VISIT (OUTPATIENT)
Dept: FAMILY MEDICINE CLINIC | Age: 68
End: 2023-05-31

## 2023-05-31 VITALS
WEIGHT: 221 LBS | TEMPERATURE: 97.9 F | BODY MASS INDEX: 29.97 KG/M2 | HEART RATE: 69 BPM | OXYGEN SATURATION: 97 % | DIASTOLIC BLOOD PRESSURE: 78 MMHG | SYSTOLIC BLOOD PRESSURE: 110 MMHG

## 2023-05-31 DIAGNOSIS — I10 ESSENTIAL HYPERTENSION: Primary | ICD-10-CM

## 2023-05-31 DIAGNOSIS — Z00.00 PREVENTATIVE HEALTH CARE: ICD-10-CM

## 2023-05-31 DIAGNOSIS — E78.2 MIXED HYPERLIPIDEMIA: ICD-10-CM

## 2023-05-31 DIAGNOSIS — Z12.11 SCREEN FOR COLON CANCER: ICD-10-CM

## 2023-05-31 DIAGNOSIS — R73.9 HYPERGLYCEMIA: ICD-10-CM

## 2023-05-31 DIAGNOSIS — M77.8 TENDONITIS OF ELBOW, LEFT: ICD-10-CM

## 2023-05-31 DIAGNOSIS — Z12.5 SCREENING FOR MALIGNANT NEOPLASM OF PROSTATE: ICD-10-CM

## 2023-05-31 RX ORDER — MELOXICAM 15 MG/1
15 TABLET ORAL DAILY PRN
Qty: 90 TABLET | Refills: 1 | Status: SHIPPED | OUTPATIENT
Start: 2023-05-31

## 2023-05-31 ASSESSMENT — PATIENT HEALTH QUESTIONNAIRE - PHQ9
6. FEELING BAD ABOUT YOURSELF - OR THAT YOU ARE A FAILURE OR HAVE LET YOURSELF OR YOUR FAMILY DOWN: 0
9. THOUGHTS THAT YOU WOULD BE BETTER OFF DEAD, OR OF HURTING YOURSELF: 0
5. POOR APPETITE OR OVEREATING: 0
7. TROUBLE CONCENTRATING ON THINGS, SUCH AS READING THE NEWSPAPER OR WATCHING TELEVISION: 0
1. LITTLE INTEREST OR PLEASURE IN DOING THINGS: 0
SUM OF ALL RESPONSES TO PHQ QUESTIONS 1-9: 0
3. TROUBLE FALLING OR STAYING ASLEEP: 0
2. FEELING DOWN, DEPRESSED OR HOPELESS: 0
SUM OF ALL RESPONSES TO PHQ QUESTIONS 1-9: 0
4. FEELING TIRED OR HAVING LITTLE ENERGY: 0
10. IF YOU CHECKED OFF ANY PROBLEMS, HOW DIFFICULT HAVE THESE PROBLEMS MADE IT FOR YOU TO DO YOUR WORK, TAKE CARE OF THINGS AT HOME, OR GET ALONG WITH OTHER PEOPLE: 0
8. MOVING OR SPEAKING SO SLOWLY THAT OTHER PEOPLE COULD HAVE NOTICED. OR THE OPPOSITE, BEING SO FIGETY OR RESTLESS THAT YOU HAVE BEEN MOVING AROUND A LOT MORE THAN USUAL: 0
SUM OF ALL RESPONSES TO PHQ QUESTIONS 1-9: 0
SUM OF ALL RESPONSES TO PHQ QUESTIONS 1-9: 0
SUM OF ALL RESPONSES TO PHQ9 QUESTIONS 1 & 2: 0

## 2023-05-31 NOTE — PROGRESS NOTES
from Last 3 Encounters:   05/31/23 29.97 kg/m²   12/15/22 30.11 kg/m²   11/21/22 29.57 kg/m²       Physical Exam  Vitals reviewed. Constitutional:       Appearance: Normal appearance. He is well-developed. Cardiovascular:      Rate and Rhythm: Normal rate and regular rhythm. Pulses: Normal pulses. Heart sounds: Normal heart sounds. No murmur heard. Pulmonary:      Effort: Pulmonary effort is normal.      Breath sounds: Normal breath sounds. Musculoskeletal:         General: Tenderness (L elbow, medial ligament strain) present. No signs of injury. Normal range of motion. Right lower leg: No edema. Left lower leg: No edema. Skin:     General: Skin is warm and dry. Findings: No bruising. Neurological:      Mental Status: He is alert and oriented to person, place, and time. Psychiatric:         Mood and Affect: Mood normal.       Assessment/Plan    1. Essential hypertension  controlled    2. Mixed hyperlipidemia  Controlled  Advised OTC omega fish oil    3. Tendonitis of elbow, left  Advised rest, ice  OTC tylenol  - meloxicam (MOBIC) 15 MG tablet; Take 1 tablet by mouth daily as needed for Pain  Dispense: 90 tablet; Refill: 1    4. Screen for colon cancer  - [facility] - Daniel Ibarra MD, Gastroenterology, [facility]      Return in about 6 months (around 11/30/2023) for annual check up, lab review. This dictation was generated by voice recognition computer software. Although all attempts are made to edit the dictation for accuracy, there may be errors in the transcription that are not intended.

## 2023-09-19 ENCOUNTER — TELEPHONE (OUTPATIENT)
Dept: FAMILY MEDICINE CLINIC | Age: 68
End: 2023-09-19

## 2023-09-19 DIAGNOSIS — L60.0 INGROWN TOENAIL: Primary | ICD-10-CM

## 2023-11-16 DIAGNOSIS — I10 ESSENTIAL HYPERTENSION: ICD-10-CM

## 2023-11-16 DIAGNOSIS — Z12.5 SCREENING FOR MALIGNANT NEOPLASM OF PROSTATE: ICD-10-CM

## 2023-11-16 DIAGNOSIS — Z00.00 PREVENTATIVE HEALTH CARE: ICD-10-CM

## 2023-11-16 DIAGNOSIS — R73.9 HYPERGLYCEMIA: ICD-10-CM

## 2023-11-16 DIAGNOSIS — E78.2 MIXED HYPERLIPIDEMIA: ICD-10-CM

## 2023-11-16 LAB
ALBUMIN SERPL-MCNC: 4.3 G/DL (ref 3.4–5)
ALBUMIN/GLOB SERPL: 1.7 {RATIO} (ref 1.1–2.2)
ALP SERPL-CCNC: 55 U/L (ref 40–129)
ALT SERPL-CCNC: 17 U/L (ref 10–40)
ANION GAP SERPL CALCULATED.3IONS-SCNC: 11 MMOL/L (ref 3–16)
AST SERPL-CCNC: 17 U/L (ref 15–37)
BASOPHILS # BLD: 0 K/UL (ref 0–0.2)
BASOPHILS NFR BLD: 0.8 %
BILIRUB SERPL-MCNC: 0.5 MG/DL (ref 0–1)
BUN SERPL-MCNC: 20 MG/DL (ref 7–20)
CALCIUM SERPL-MCNC: 9.3 MG/DL (ref 8.3–10.6)
CHLORIDE SERPL-SCNC: 105 MMOL/L (ref 99–110)
CHOLEST SERPL-MCNC: 119 MG/DL (ref 0–199)
CO2 SERPL-SCNC: 25 MMOL/L (ref 21–32)
CREAT SERPL-MCNC: 1 MG/DL (ref 0.8–1.3)
DEPRECATED RDW RBC AUTO: 12.8 % (ref 12.4–15.4)
EOSINOPHIL # BLD: 0.1 K/UL (ref 0–0.6)
EOSINOPHIL NFR BLD: 1.2 %
GFR SERPLBLD CREATININE-BSD FMLA CKD-EPI: >60 ML/MIN/{1.73_M2}
GLUCOSE P FAST SERPL-MCNC: 94 MG/DL (ref 70–99)
HCT VFR BLD AUTO: 43.6 % (ref 40.5–52.5)
HDLC SERPL-MCNC: 39 MG/DL (ref 40–60)
HGB BLD-MCNC: 14.9 G/DL (ref 13.5–17.5)
LDL CHOLESTEROL CALCULATED: 70 MG/DL
LYMPHOCYTES # BLD: 1.9 K/UL (ref 1–5.1)
LYMPHOCYTES NFR BLD: 35.6 %
MCH RBC QN AUTO: 32.4 PG (ref 26–34)
MCHC RBC AUTO-ENTMCNC: 34.1 G/DL (ref 31–36)
MCV RBC AUTO: 94.8 FL (ref 80–100)
MONOCYTES # BLD: 0.4 K/UL (ref 0–1.3)
MONOCYTES NFR BLD: 7.7 %
NEUTROPHILS # BLD: 2.9 K/UL (ref 1.7–7.7)
NEUTROPHILS NFR BLD: 54.7 %
PLATELET # BLD AUTO: 222 K/UL (ref 135–450)
PMV BLD AUTO: 8.5 FL (ref 5–10.5)
POTASSIUM SERPL-SCNC: 4.4 MMOL/L (ref 3.5–5.1)
PROT SERPL-MCNC: 6.9 G/DL (ref 6.4–8.2)
PSA SERPL DL<=0.01 NG/ML-MCNC: 0.89 NG/ML (ref 0–4)
RBC # BLD AUTO: 4.6 M/UL (ref 4.2–5.9)
SODIUM SERPL-SCNC: 141 MMOL/L (ref 136–145)
TRIGL SERPL-MCNC: 52 MG/DL (ref 0–150)
VLDLC SERPL CALC-MCNC: 10 MG/DL
WBC # BLD AUTO: 5.3 K/UL (ref 4–11)

## 2023-11-17 LAB
EST. AVERAGE GLUCOSE BLD GHB EST-MCNC: 105.4 MG/DL
HBA1C MFR BLD: 5.3 %

## 2023-11-27 SDOH — HEALTH STABILITY: PHYSICAL HEALTH: ON AVERAGE, HOW MANY MINUTES DO YOU ENGAGE IN EXERCISE AT THIS LEVEL?: 40 MIN

## 2023-11-27 SDOH — HEALTH STABILITY: PHYSICAL HEALTH: ON AVERAGE, HOW MANY DAYS PER WEEK DO YOU ENGAGE IN MODERATE TO STRENUOUS EXERCISE (LIKE A BRISK WALK)?: 4 DAYS

## 2023-11-27 ASSESSMENT — PATIENT HEALTH QUESTIONNAIRE - PHQ9
SUM OF ALL RESPONSES TO PHQ QUESTIONS 1-9: 0
SUM OF ALL RESPONSES TO PHQ9 QUESTIONS 1 & 2: 0
2. FEELING DOWN, DEPRESSED OR HOPELESS: 0
1. LITTLE INTEREST OR PLEASURE IN DOING THINGS: 0
SUM OF ALL RESPONSES TO PHQ QUESTIONS 1-9: 0

## 2023-11-27 ASSESSMENT — LIFESTYLE VARIABLES
HOW OFTEN DO YOU HAVE A DRINK CONTAINING ALCOHOL: NEVER
HOW MANY STANDARD DRINKS CONTAINING ALCOHOL DO YOU HAVE ON A TYPICAL DAY: PATIENT DOES NOT DRINK

## 2023-11-30 DIAGNOSIS — E78.2 MIXED HYPERLIPIDEMIA: ICD-10-CM

## 2023-11-30 DIAGNOSIS — I10 ESSENTIAL HYPERTENSION: ICD-10-CM

## 2023-11-30 RX ORDER — LOSARTAN POTASSIUM 25 MG/1
TABLET ORAL
Qty: 90 TABLET | Refills: 0 | Status: SHIPPED | OUTPATIENT
Start: 2023-11-30

## 2023-11-30 RX ORDER — ATORVASTATIN CALCIUM 20 MG/1
TABLET, FILM COATED ORAL
Qty: 90 TABLET | Refills: 0 | Status: SHIPPED | OUTPATIENT
Start: 2023-11-30

## 2023-12-04 SDOH — HEALTH STABILITY: PHYSICAL HEALTH: ON AVERAGE, HOW MANY DAYS PER WEEK DO YOU ENGAGE IN MODERATE TO STRENUOUS EXERCISE (LIKE A BRISK WALK)?: 4 DAYS

## 2023-12-04 SDOH — HEALTH STABILITY: PHYSICAL HEALTH: ON AVERAGE, HOW MANY MINUTES DO YOU ENGAGE IN EXERCISE AT THIS LEVEL?: 40 MIN

## 2023-12-04 ASSESSMENT — PATIENT HEALTH QUESTIONNAIRE - PHQ9
SUM OF ALL RESPONSES TO PHQ9 QUESTIONS 1 & 2: 0
SUM OF ALL RESPONSES TO PHQ QUESTIONS 1-9: 0
2. FEELING DOWN, DEPRESSED OR HOPELESS: 0
SUM OF ALL RESPONSES TO PHQ QUESTIONS 1-9: 0
1. LITTLE INTEREST OR PLEASURE IN DOING THINGS: 0
SUM OF ALL RESPONSES TO PHQ QUESTIONS 1-9: 0
SUM OF ALL RESPONSES TO PHQ QUESTIONS 1-9: 0

## 2023-12-04 ASSESSMENT — LIFESTYLE VARIABLES
HOW OFTEN DO YOU HAVE A DRINK CONTAINING ALCOHOL: 1
HOW OFTEN DO YOU HAVE SIX OR MORE DRINKS ON ONE OCCASION: 1
HOW MANY STANDARD DRINKS CONTAINING ALCOHOL DO YOU HAVE ON A TYPICAL DAY: 0
HOW MANY STANDARD DRINKS CONTAINING ALCOHOL DO YOU HAVE ON A TYPICAL DAY: PATIENT DOES NOT DRINK
HOW OFTEN DO YOU HAVE A DRINK CONTAINING ALCOHOL: NEVER

## 2023-12-05 ENCOUNTER — OFFICE VISIT (OUTPATIENT)
Dept: FAMILY MEDICINE CLINIC | Age: 68
End: 2023-12-05
Payer: MEDICARE

## 2023-12-05 VITALS
HEART RATE: 79 BPM | TEMPERATURE: 97.4 F | OXYGEN SATURATION: 98 % | HEIGHT: 72 IN | SYSTOLIC BLOOD PRESSURE: 132 MMHG | WEIGHT: 221 LBS | BODY MASS INDEX: 29.93 KG/M2 | DIASTOLIC BLOOD PRESSURE: 74 MMHG

## 2023-12-05 DIAGNOSIS — G47.09 OTHER INSOMNIA: ICD-10-CM

## 2023-12-05 DIAGNOSIS — Z23 NEED FOR VACCINATION: ICD-10-CM

## 2023-12-05 DIAGNOSIS — I10 ESSENTIAL HYPERTENSION: ICD-10-CM

## 2023-12-05 DIAGNOSIS — E78.2 MIXED HYPERLIPIDEMIA: ICD-10-CM

## 2023-12-05 DIAGNOSIS — Z00.00 ENCOUNTER FOR SUBSEQUENT ANNUAL WELLNESS VISIT (AWV) IN MEDICARE PATIENT: Primary | ICD-10-CM

## 2023-12-05 PROCEDURE — 90715 TDAP VACCINE 7 YRS/> IM: CPT | Performed by: NURSE PRACTITIONER

## 2023-12-05 PROCEDURE — G8484 FLU IMMUNIZE NO ADMIN: HCPCS | Performed by: NURSE PRACTITIONER

## 2023-12-05 PROCEDURE — G0439 PPPS, SUBSEQ VISIT: HCPCS | Performed by: NURSE PRACTITIONER

## 2023-12-05 PROCEDURE — 3078F DIAST BP <80 MM HG: CPT | Performed by: NURSE PRACTITIONER

## 2023-12-05 PROCEDURE — 3075F SYST BP GE 130 - 139MM HG: CPT | Performed by: NURSE PRACTITIONER

## 2023-12-05 PROCEDURE — 90471 IMMUNIZATION ADMIN: CPT | Performed by: NURSE PRACTITIONER

## 2023-12-05 PROCEDURE — 1123F ACP DISCUSS/DSCN MKR DOCD: CPT | Performed by: NURSE PRACTITIONER

## 2023-12-05 PROCEDURE — 3017F COLORECTAL CA SCREEN DOC REV: CPT | Performed by: NURSE PRACTITIONER

## 2023-12-05 RX ORDER — TRAZODONE HYDROCHLORIDE 50 MG/1
50 TABLET ORAL NIGHTLY PRN
Qty: 30 TABLET | Refills: 0 | Status: SHIPPED | OUTPATIENT
Start: 2023-12-05

## 2024-02-11 DIAGNOSIS — I10 ESSENTIAL HYPERTENSION: ICD-10-CM

## 2024-02-11 DIAGNOSIS — E78.2 MIXED HYPERLIPIDEMIA: ICD-10-CM

## 2024-02-12 RX ORDER — ATORVASTATIN CALCIUM 20 MG/1
TABLET, FILM COATED ORAL
Qty: 90 TABLET | Refills: 3 | Status: SHIPPED | OUTPATIENT
Start: 2024-02-12

## 2024-02-12 RX ORDER — LOSARTAN POTASSIUM 25 MG/1
TABLET ORAL
Qty: 90 TABLET | Refills: 3 | Status: SHIPPED | OUTPATIENT
Start: 2024-02-12

## 2024-12-06 SDOH — ECONOMIC STABILITY: FOOD INSECURITY: WITHIN THE PAST 12 MONTHS, YOU WORRIED THAT YOUR FOOD WOULD RUN OUT BEFORE YOU GOT MONEY TO BUY MORE.: NEVER TRUE

## 2024-12-06 SDOH — ECONOMIC STABILITY: FOOD INSECURITY: WITHIN THE PAST 12 MONTHS, THE FOOD YOU BOUGHT JUST DIDN'T LAST AND YOU DIDN'T HAVE MONEY TO GET MORE.: NEVER TRUE

## 2024-12-06 SDOH — ECONOMIC STABILITY: INCOME INSECURITY: HOW HARD IS IT FOR YOU TO PAY FOR THE VERY BASICS LIKE FOOD, HOUSING, MEDICAL CARE, AND HEATING?: NOT HARD AT ALL

## 2024-12-06 SDOH — HEALTH STABILITY: PHYSICAL HEALTH: ON AVERAGE, HOW MANY DAYS PER WEEK DO YOU ENGAGE IN MODERATE TO STRENUOUS EXERCISE (LIKE A BRISK WALK)?: 7 DAYS

## 2024-12-06 SDOH — HEALTH STABILITY: PHYSICAL HEALTH: ON AVERAGE, HOW MANY MINUTES DO YOU ENGAGE IN EXERCISE AT THIS LEVEL?: 30 MIN

## 2024-12-06 ASSESSMENT — PATIENT HEALTH QUESTIONNAIRE - PHQ9
2. FEELING DOWN, DEPRESSED OR HOPELESS: NOT AT ALL
SUM OF ALL RESPONSES TO PHQ QUESTIONS 1-9: 0
1. LITTLE INTEREST OR PLEASURE IN DOING THINGS: NOT AT ALL
SUM OF ALL RESPONSES TO PHQ9 QUESTIONS 1 & 2: 0
SUM OF ALL RESPONSES TO PHQ QUESTIONS 1-9: 0

## 2024-12-06 ASSESSMENT — LIFESTYLE VARIABLES
HOW OFTEN DO YOU HAVE A DRINK CONTAINING ALCOHOL: 1
HOW MANY STANDARD DRINKS CONTAINING ALCOHOL DO YOU HAVE ON A TYPICAL DAY: PATIENT DOES NOT DRINK
HOW OFTEN DO YOU HAVE A DRINK CONTAINING ALCOHOL: NEVER
HOW MANY STANDARD DRINKS CONTAINING ALCOHOL DO YOU HAVE ON A TYPICAL DAY: 0
HOW OFTEN DO YOU HAVE SIX OR MORE DRINKS ON ONE OCCASION: 1

## 2024-12-09 ENCOUNTER — OFFICE VISIT (OUTPATIENT)
Dept: FAMILY MEDICINE CLINIC | Age: 69
End: 2024-12-09

## 2024-12-09 VITALS
TEMPERATURE: 97.8 F | SYSTOLIC BLOOD PRESSURE: 138 MMHG | WEIGHT: 225.6 LBS | OXYGEN SATURATION: 97 % | BODY MASS INDEX: 30.56 KG/M2 | HEIGHT: 72 IN | HEART RATE: 64 BPM | DIASTOLIC BLOOD PRESSURE: 72 MMHG

## 2024-12-09 DIAGNOSIS — E78.2 MIXED HYPERLIPIDEMIA: ICD-10-CM

## 2024-12-09 DIAGNOSIS — Z12.5 SCREENING FOR MALIGNANT NEOPLASM OF PROSTATE: ICD-10-CM

## 2024-12-09 DIAGNOSIS — Z23 NEED FOR PNEUMOCOCCAL VACCINE: ICD-10-CM

## 2024-12-09 DIAGNOSIS — Z00.00 ENCOUNTER FOR SUBSEQUENT ANNUAL WELLNESS VISIT (AWV) IN MEDICARE PATIENT: Primary | ICD-10-CM

## 2024-12-09 DIAGNOSIS — I10 ESSENTIAL HYPERTENSION: ICD-10-CM

## 2024-12-09 NOTE — PROGRESS NOTES
(AWV) in Medicare patient  Advised routine dental and vision  Increased exercise, healthy diet and weight loss  Advised living will  Advised covid booster  Repeat colonoscopy 2030    Essential hypertension  -     CBC with Auto Differential; Future  -     Comprehensive Metabolic Panel, Fasting; Future  Controlled    Mixed hyperlipidemia  -     Lipid, Fasting; Future  -     Comprehensive Metabolic Panel, Fasting; Future  Controlled  Suggested OTC omega fish oil    Screening for malignant neoplasm of prostate  -     PSA Screening; Future    Need for pneumococcal vaccine  -     Pneumococcal, PCV20, PREVNAR 20, (age 6w+), IM, PF  Administered          Return in about 1 year (around 12/9/2025) for annual check up.         Note per Sarah Schulz MA and scribe with corrections and edits per LACHO Mclaughlin NP. I agree with the entirety of note and I was present and performed history and physical. I also confirm that the note above accurately reflects all work, treatment, procedures, and medical decision making performed by me, LACHO Mclaughlin NP

## 2025-01-21 DIAGNOSIS — I10 ESSENTIAL HYPERTENSION: ICD-10-CM

## 2025-01-21 DIAGNOSIS — Z12.5 SCREENING FOR MALIGNANT NEOPLASM OF PROSTATE: ICD-10-CM

## 2025-01-21 DIAGNOSIS — E78.2 MIXED HYPERLIPIDEMIA: ICD-10-CM

## 2025-01-22 LAB
ALBUMIN SERPL-MCNC: 4.3 G/DL (ref 3.4–5)
ALBUMIN/GLOB SERPL: 1.4 {RATIO} (ref 1.1–2.2)
ALP SERPL-CCNC: 49 U/L (ref 40–129)
ALT SERPL-CCNC: 21 U/L (ref 10–40)
ANION GAP SERPL CALCULATED.3IONS-SCNC: 10 MMOL/L (ref 3–16)
AST SERPL-CCNC: 21 U/L (ref 15–37)
BILIRUB SERPL-MCNC: 0.5 MG/DL (ref 0–1)
BUN SERPL-MCNC: 22 MG/DL (ref 7–20)
CALCIUM SERPL-MCNC: 9.4 MG/DL (ref 8.3–10.6)
CHLORIDE SERPL-SCNC: 103 MMOL/L (ref 99–110)
CHOLEST SERPL-MCNC: 129 MG/DL (ref 0–199)
CO2 SERPL-SCNC: 27 MMOL/L (ref 21–32)
CREAT SERPL-MCNC: 1 MG/DL (ref 0.8–1.3)
GFR SERPLBLD CREATININE-BSD FMLA CKD-EPI: 82 ML/MIN/{1.73_M2}
GLUCOSE P FAST SERPL-MCNC: 89 MG/DL (ref 70–99)
HDLC SERPL-MCNC: 35 MG/DL (ref 40–60)
LDL CHOLESTEROL: 79 MG/DL
POTASSIUM SERPL-SCNC: 4.6 MMOL/L (ref 3.5–5.1)
PROT SERPL-MCNC: 7.3 G/DL (ref 6.4–8.2)
PSA SERPL DL<=0.01 NG/ML-MCNC: 0.8 NG/ML (ref 0–4)
SODIUM SERPL-SCNC: 140 MMOL/L (ref 136–145)
TRIGL SERPL-MCNC: 76 MG/DL (ref 0–150)
VLDLC SERPL CALC-MCNC: 15 MG/DL

## 2025-04-03 DIAGNOSIS — I10 ESSENTIAL HYPERTENSION: ICD-10-CM

## 2025-04-03 DIAGNOSIS — E78.2 MIXED HYPERLIPIDEMIA: ICD-10-CM

## 2025-04-03 RX ORDER — ATORVASTATIN CALCIUM 20 MG/1
20 TABLET, FILM COATED ORAL DAILY
Qty: 90 TABLET | Refills: 2 | Status: SHIPPED | OUTPATIENT
Start: 2025-04-03 | End: 2025-04-04 | Stop reason: SDUPTHER

## 2025-04-03 RX ORDER — LOSARTAN POTASSIUM 25 MG/1
TABLET ORAL
Qty: 90 TABLET | Refills: 2 | Status: SHIPPED | OUTPATIENT
Start: 2025-04-03 | End: 2025-04-04 | Stop reason: SDUPTHER

## 2025-04-03 NOTE — TELEPHONE ENCOUNTER
losartan (COZAAR) 25 MG tablet        atorvastatin (LIPITOR) 20 MG tablet       Note this is a new pharmacy[facility]      [facility] [place] [facility] [address] [facility] [address] - P [phone] - F [phone] [23]

## 2025-04-04 DIAGNOSIS — E78.2 MIXED HYPERLIPIDEMIA: ICD-10-CM

## 2025-04-04 DIAGNOSIS — I10 ESSENTIAL HYPERTENSION: ICD-10-CM

## 2025-04-04 RX ORDER — LOSARTAN POTASSIUM 25 MG/1
TABLET ORAL
Qty: 90 TABLET | Refills: 2 | Status: SHIPPED | OUTPATIENT
Start: 2025-04-04

## 2025-04-04 RX ORDER — ATORVASTATIN CALCIUM 20 MG/1
20 TABLET, FILM COATED ORAL DAILY
Qty: 90 TABLET | Refills: 2 | Status: SHIPPED | OUTPATIENT
Start: 2025-04-04

## 2025-07-01 ENCOUNTER — APPOINTMENT (OUTPATIENT)
Dept: MRI IMAGING | Age: 70
DRG: 065 | End: 2025-07-01
Payer: COMMERCIAL

## 2025-07-01 ENCOUNTER — APPOINTMENT (OUTPATIENT)
Dept: CT IMAGING | Age: 70
DRG: 065 | End: 2025-07-01
Payer: COMMERCIAL

## 2025-07-01 ENCOUNTER — HOSPITAL ENCOUNTER (INPATIENT)
Age: 70
LOS: 1 days | Discharge: HOME | DRG: 065 | End: 2025-07-02
Attending: EMERGENCY MEDICINE | Admitting: INTERNAL MEDICINE
Payer: COMMERCIAL

## 2025-07-01 DIAGNOSIS — R29.898 LEFT HAND WEAKNESS: ICD-10-CM

## 2025-07-01 DIAGNOSIS — R20.2 PARESTHESIA: ICD-10-CM

## 2025-07-01 DIAGNOSIS — G45.9 TIA (TRANSIENT ISCHEMIC ATTACK): Primary | ICD-10-CM

## 2025-07-01 DIAGNOSIS — I63.9 ACUTE CVA (CEREBROVASCULAR ACCIDENT) (HCC): ICD-10-CM

## 2025-07-01 LAB
ANION GAP SERPL CALCULATED.3IONS-SCNC: 11 MMOL/L (ref 3–16)
BASOPHILS # BLD: 0 K/UL (ref 0–0.2)
BASOPHILS NFR BLD: 0.6 %
BUN SERPL-MCNC: 19 MG/DL (ref 7–20)
CALCIUM SERPL-MCNC: 9.4 MG/DL (ref 8.3–10.6)
CHLORIDE SERPL-SCNC: 104 MMOL/L (ref 99–110)
CO2 SERPL-SCNC: 23 MMOL/L (ref 21–32)
CREAT SERPL-MCNC: 1 MG/DL (ref 0.8–1.3)
DEPRECATED RDW RBC AUTO: 13.3 % (ref 12.4–15.4)
EKG ATRIAL RATE: 75 BPM
EKG DIAGNOSIS: NORMAL
EKG P AXIS: 23 DEGREES
EKG P-R INTERVAL: 256 MS
EKG Q-T INTERVAL: 382 MS
EKG QRS DURATION: 104 MS
EKG QTC CALCULATION (BAZETT): 426 MS
EKG R AXIS: 89 DEGREES
EKG T AXIS: 3 DEGREES
EKG VENTRICULAR RATE: 75 BPM
EOSINOPHIL # BLD: 0.1 K/UL (ref 0–0.6)
EOSINOPHIL NFR BLD: 1.9 %
GFR SERPLBLD CREATININE-BSD FMLA CKD-EPI: 82 ML/MIN/{1.73_M2}
GLUCOSE SERPL-MCNC: 100 MG/DL (ref 70–99)
HCT VFR BLD AUTO: 42.1 % (ref 40.5–52.5)
HGB BLD-MCNC: 14.4 G/DL (ref 13.5–17.5)
LYMPHOCYTES # BLD: 1.9 K/UL (ref 1–5.1)
LYMPHOCYTES NFR BLD: 35.9 %
MCH RBC QN AUTO: 32.3 PG (ref 26–34)
MCHC RBC AUTO-ENTMCNC: 34.1 G/DL (ref 31–36)
MCV RBC AUTO: 94.6 FL (ref 80–100)
MONOCYTES # BLD: 0.4 K/UL (ref 0–1.3)
MONOCYTES NFR BLD: 8.5 %
NEUTROPHILS # BLD: 2.7 K/UL (ref 1.7–7.7)
NEUTROPHILS NFR BLD: 53.1 %
PLATELET # BLD AUTO: 219 K/UL (ref 135–450)
PMV BLD AUTO: 7.6 FL (ref 5–10.5)
POTASSIUM SERPL-SCNC: 4.1 MMOL/L (ref 3.5–5.1)
RBC # BLD AUTO: 4.45 M/UL (ref 4.2–5.9)
SODIUM SERPL-SCNC: 138 MMOL/L (ref 136–145)
TROPONIN, HIGH SENSITIVITY: 13 NG/L (ref 0–22)
WBC # BLD AUTO: 5.2 K/UL (ref 4–11)

## 2025-07-01 PROCEDURE — 70496 CT ANGIOGRAPHY HEAD: CPT

## 2025-07-01 PROCEDURE — 99285 EMERGENCY DEPT VISIT HI MDM: CPT

## 2025-07-01 PROCEDURE — 93005 ELECTROCARDIOGRAM TRACING: CPT | Performed by: EMERGENCY MEDICINE

## 2025-07-01 PROCEDURE — 6360000004 HC RX CONTRAST MEDICATION: Performed by: EMERGENCY MEDICINE

## 2025-07-01 PROCEDURE — 6370000000 HC RX 637 (ALT 250 FOR IP): Performed by: INTERNAL MEDICINE

## 2025-07-01 PROCEDURE — 36415 COLL VENOUS BLD VENIPUNCTURE: CPT

## 2025-07-01 PROCEDURE — 84484 ASSAY OF TROPONIN QUANT: CPT

## 2025-07-01 PROCEDURE — 2500000003 HC RX 250 WO HCPCS: Performed by: INTERNAL MEDICINE

## 2025-07-01 PROCEDURE — 70551 MRI BRAIN STEM W/O DYE: CPT

## 2025-07-01 PROCEDURE — 85025 COMPLETE CBC W/AUTO DIFF WBC: CPT

## 2025-07-01 PROCEDURE — 6360000002 HC RX W HCPCS: Performed by: INTERNAL MEDICINE

## 2025-07-01 PROCEDURE — 80048 BASIC METABOLIC PNL TOTAL CA: CPT

## 2025-07-01 PROCEDURE — 93010 ELECTROCARDIOGRAM REPORT: CPT | Performed by: INTERNAL MEDICINE

## 2025-07-01 PROCEDURE — 6370000000 HC RX 637 (ALT 250 FOR IP)

## 2025-07-01 PROCEDURE — 70450 CT HEAD/BRAIN W/O DYE: CPT

## 2025-07-01 PROCEDURE — 2060000000 HC ICU INTERMEDIATE R&B

## 2025-07-01 RX ORDER — ONDANSETRON 2 MG/ML
4 INJECTION INTRAMUSCULAR; INTRAVENOUS EVERY 6 HOURS PRN
Status: DISCONTINUED | OUTPATIENT
Start: 2025-07-01 | End: 2025-07-02 | Stop reason: HOSPADM

## 2025-07-01 RX ORDER — SODIUM CHLORIDE 0.9 % (FLUSH) 0.9 %
5-40 SYRINGE (ML) INJECTION EVERY 12 HOURS SCHEDULED
Status: DISCONTINUED | OUTPATIENT
Start: 2025-07-01 | End: 2025-07-02 | Stop reason: HOSPADM

## 2025-07-01 RX ORDER — ASPIRIN 81 MG/1
324 TABLET, CHEWABLE ORAL ONCE
Status: DISCONTINUED | OUTPATIENT
Start: 2025-07-01 | End: 2025-07-02 | Stop reason: HOSPADM

## 2025-07-01 RX ORDER — ONDANSETRON 4 MG/1
4 TABLET, ORALLY DISINTEGRATING ORAL EVERY 8 HOURS PRN
Status: DISCONTINUED | OUTPATIENT
Start: 2025-07-01 | End: 2025-07-02 | Stop reason: HOSPADM

## 2025-07-01 RX ORDER — ASPIRIN 81 MG/1
81 TABLET ORAL DAILY
Status: DISCONTINUED | OUTPATIENT
Start: 2025-07-01 | End: 2025-07-02 | Stop reason: HOSPADM

## 2025-07-01 RX ORDER — ATORVASTATIN CALCIUM 40 MG/1
40 TABLET, FILM COATED ORAL ONCE
Status: DISCONTINUED | OUTPATIENT
Start: 2025-07-01 | End: 2025-07-01 | Stop reason: SDUPTHER

## 2025-07-01 RX ORDER — POLYETHYLENE GLYCOL 3350 17 G/17G
17 POWDER, FOR SOLUTION ORAL DAILY PRN
Status: DISCONTINUED | OUTPATIENT
Start: 2025-07-01 | End: 2025-07-02 | Stop reason: HOSPADM

## 2025-07-01 RX ORDER — ACETAMINOPHEN 325 MG/1
650 TABLET ORAL EVERY 6 HOURS PRN
Status: DISCONTINUED | OUTPATIENT
Start: 2025-07-01 | End: 2025-07-02 | Stop reason: HOSPADM

## 2025-07-01 RX ORDER — MAGNESIUM SULFATE IN WATER 40 MG/ML
2000 INJECTION, SOLUTION INTRAVENOUS PRN
Status: DISCONTINUED | OUTPATIENT
Start: 2025-07-01 | End: 2025-07-02 | Stop reason: HOSPADM

## 2025-07-01 RX ORDER — ENOXAPARIN SODIUM 100 MG/ML
30 INJECTION SUBCUTANEOUS 2 TIMES DAILY
Status: DISCONTINUED | OUTPATIENT
Start: 2025-07-01 | End: 2025-07-02 | Stop reason: HOSPADM

## 2025-07-01 RX ORDER — IOPAMIDOL 755 MG/ML
75 INJECTION, SOLUTION INTRAVASCULAR
Status: COMPLETED | OUTPATIENT
Start: 2025-07-01 | End: 2025-07-01

## 2025-07-01 RX ORDER — POTASSIUM CHLORIDE 7.45 MG/ML
10 INJECTION INTRAVENOUS PRN
Status: DISCONTINUED | OUTPATIENT
Start: 2025-07-01 | End: 2025-07-02 | Stop reason: HOSPADM

## 2025-07-01 RX ORDER — POTASSIUM CHLORIDE 1500 MG/1
40 TABLET, EXTENDED RELEASE ORAL PRN
Status: DISCONTINUED | OUTPATIENT
Start: 2025-07-01 | End: 2025-07-02 | Stop reason: HOSPADM

## 2025-07-01 RX ORDER — SODIUM CHLORIDE 9 MG/ML
INJECTION, SOLUTION INTRAVENOUS PRN
Status: DISCONTINUED | OUTPATIENT
Start: 2025-07-01 | End: 2025-07-02 | Stop reason: HOSPADM

## 2025-07-01 RX ORDER — ATORVASTATIN CALCIUM 80 MG/1
80 TABLET, FILM COATED ORAL NIGHTLY
Status: DISCONTINUED | OUTPATIENT
Start: 2025-07-01 | End: 2025-07-02 | Stop reason: HOSPADM

## 2025-07-01 RX ORDER — ACETAMINOPHEN 650 MG/1
650 SUPPOSITORY RECTAL EVERY 6 HOURS PRN
Status: DISCONTINUED | OUTPATIENT
Start: 2025-07-01 | End: 2025-07-02 | Stop reason: HOSPADM

## 2025-07-01 RX ORDER — SODIUM CHLORIDE 0.9 % (FLUSH) 0.9 %
5-40 SYRINGE (ML) INJECTION PRN
Status: DISCONTINUED | OUTPATIENT
Start: 2025-07-01 | End: 2025-07-02 | Stop reason: HOSPADM

## 2025-07-01 RX ADMIN — ENOXAPARIN SODIUM 30 MG: 100 INJECTION SUBCUTANEOUS at 21:37

## 2025-07-01 RX ADMIN — SODIUM CHLORIDE, PRESERVATIVE FREE 10 ML: 5 INJECTION INTRAVENOUS at 21:38

## 2025-07-01 RX ADMIN — ASPIRIN 81 MG: 81 TABLET, DELAYED RELEASE ORAL at 17:37

## 2025-07-01 RX ADMIN — ATORVASTATIN CALCIUM 80 MG: 80 TABLET, FILM COATED ORAL at 21:37

## 2025-07-01 RX ADMIN — MELATONIN TAB 3 MG 9 MG: 3 TAB at 21:43

## 2025-07-01 RX ADMIN — IOPAMIDOL 75 ML: 755 INJECTION, SOLUTION INTRAVENOUS at 08:45

## 2025-07-01 ASSESSMENT — PAIN SCALES - GENERAL: PAINLEVEL_OUTOF10: 0

## 2025-07-01 NOTE — ED NOTES
Patient Name: Narendra Gomez  : 1955 69 y.o.  MRN: [id number]  ED Room #: ED-0001     Chief complaint:   Chief Complaint   Patient presents with    Numbness     Pt to ED c/o left sided numbness to finger and face while I the shower that lasted \"a couple of minutes\". Pt sts he took two baby aspirins. Pt sts I feel normal now. PT sts this occurred between 5092-6366. Pt is not on blood thinners. NIH 0    Extremity Weakness     Hospital Problem/Diagnosis:   Hospital Problems           Last Modified POA    * (Principal) Acute CVA (cerebrovascular accident) ([facility]) 2025 Yes         O2 Flow Rate:    (if applicable)  Cardiac Rhythm:   (if applicable)  Active LDA's:   Peripheral IV 25 Distal;Left;Anterior Cephalic (Active)            How does patient ambulate? Stand by assist    2. How does patient take pills? Whole with Water    3. Is patient alert? Alert    4. Is patient oriented? To Person, To Place, To Time, To Situation, and Follows Commands    5.   Patient arrived from:  home    6. If patient is disoriented or from a Skill Nursing Facility has family been notified of admission? N/A    7. Patient belongings? Belongings: Cell Phone, Clothing, and Glasses    Disposition of belongings? Kept with Patient     8. Any specific patient or family belongings/needs/dynamics?   a. N/A    9. Miscellaneous comments/pending orders?  a. All ED orders complete.       If there are any additional questions please reach out to the Emergency Department.

## 2025-07-01 NOTE — PLAN OF CARE
STROKE TEAM PLAN OF CARE    Name:  Narendra Gomez  : 1955  MRN:  [id number]  Today's Date: 2025    Stroke team contacted at: 08:37  History obtained from: emergency physician    History     Narendra Gomez is a 69 y.o. male who presented with left hand clumsiness.    Briefly, pt was LKW at 07:45 when he noticed paresthesias to the left face and left hand. Associated with clumsiness of the left hand.  Symptoms have now significantly improved, and pt is at baseline except for possible slight facial droop noted in the ED. No other deficits.    Imaging     CTA HEAD NECK W CONTRAST   Final Result   Unremarkable CTA of the head and neck.         CT HEAD WO CONTRAST   Final Result   Addendum (preliminary)    ADDENDUM:   Findings were discussed with ES MULLIGAN at 9:12 am on 2025.         Final   No acute intracranial abnormality.     Acute Ischemic Stroke Clinical Decision Making     (1) Intravenous thrombolysis:  The patient is not a candidate for IV thrombolysis based on:  Symptoms nondisabling / rapidly resolving    (2) Angiography / Thrombectomy:  The patient does not have evidence of a proximal large vessel occlusion on CTA, and therefore is not an EVT candidate.    For any additional questions regarding acute stroke care, please feel free to contact the  Stroke Team at [phone].     MD Rohit   Stroke Team   2025 10:03 AM    Total inter-professional remote consultation time: 5 minutes

## 2025-07-01 NOTE — H&P
V2.0  History and Physical      Name:  Narendra Gomez /Age/Sex: 1955  (69 y.o. male)   MRN & CSN:  [id number] & [id number] Encounter Date/Time: 2025 5:01 PM EDT   [id number] PCP: Emma Bates, APRN - NP       Hospital Day: 1    Assessment and Plan:   Narendra Gomez is a 69 y.o. male with a pmh of with pmh of HTN, HLD admitted with acute CVA involving the right precentral gyrus.    Hospital Problems           Last Modified POA    * (Principal) Acute CVA (cerebrovascular accident) (HCC) 2025 Yes       Plan:    Acute CVA:  MRI brain: subtle 2 cm focus of restricted diffusion within the right precentral  gyrus without corresponding FLAIR signal abnormality consistent with a  hyperacute infarct, with adjacent hyperintense M4 branch, likely a distal  arterial occlusion.    Continue aspirin and statin.  Follow-up TTE, A1c, lipid panel  Neurochecks, tele monitoring  Neurology consult  PT OT, SLP evaluation    HTN: Hold losartan.    Disposition:   Current Living situation: Home  Expected Disposition: PT OT  Estimated D/C:     Diet ADULT DIET; Regular; Low Sodium (2 gm)   DVT Prophylaxis [x] Lovenox, []  Heparin, [] SCDs, [] Ambulation,  [] Eliquis, [] Xarelto, [] Coumadin   Code Status Full Code   Surrogate Decision Maker/ DALIA  Lionel Gomez (Spouse)  [phone]     Personally reviewed Lab Studies and Imaging     EKG interpreted personally and results showed sinus rhythm with first-degree AV block.    History from:     patient, electronic medical record    History of Present Illness:     Chief Complaint: Left-sided numbness  Narendra Gomez is a 69 y.o. male with pmh of HTN, HLD presented from home with complaints of sudden onset left lower lip and hand numbness while in the shower this morning at ~7:45 AM. He immediately took 2 baby aspirin's but subsequently noted left hand weakness with clumsiness while buttoning his shirt thus was brought in for stroke evaluation.

## 2025-07-01 NOTE — ED PROVIDER NOTES
condition->Emergency Medical Condition (MA) Reason for Exam: Left face, hand clumsiness left hand ro right mca, joseph occlusion FINDINGS: BRAIN/VENTRICLES: There is no acute intracranial hemorrhage, mass effect, or midline shift.  There is satisfactory overall gray-white matter differentiation.  The ventricular structures are symmetric and unremarkable. The infratentorial structures are unremarkable. ORBITS: The visualized portion of the orbits demonstrate no acute abnormality. SINUSES: The visualized paranasal sinuses and mastoid air cells demonstrate no acute abnormality. SOFT TISSUES/SKULL:  No acute abnormality of the visualized skull or soft tissues.     No acute intracranial abnormality.       MDM:  Patient presented to the ER for evaluation of suspected acute TIA, or resolved stroke,    #1: Acute TIA.  Stroke team was consulted on arrival and he still has accelerated hypertension likely autoregulation he had left face lower paresis possible very mild residual NIH stroke scale of 1 no visual field deficit resolved hand clumsiness and paresthesia resolved.  He has no disabling symptoms and and positive marked improvement in his symptoms he is not a candidate for thrombolysis.  He time his reset to 0, he took aspirin and his Lipitor prior to arrival.  He is not in atrial fibrillation.  Echocardiogram MRI neurology consult    Upon my evaluation, this patient had a high probability of imminent or life-threatening deterioration due to tia/ stroke   which required my direct attention, intervention, and personal management.    The total critical care time personally spent while evaluating and treating this patient was 35  minutes exclusive of any time spent doing separately billable procedures.  This includes time at the bedside, data interpretation, medication management, monitoring for potential decompensation and physician consultation.  Specifics of interventions taken and potentially life-threatening diagnostic

## 2025-07-02 ENCOUNTER — APPOINTMENT (OUTPATIENT)
Age: 70
DRG: 065 | End: 2025-07-02
Attending: INTERNAL MEDICINE
Payer: COMMERCIAL

## 2025-07-02 VITALS
WEIGHT: 225 LBS | OXYGEN SATURATION: 95 % | SYSTOLIC BLOOD PRESSURE: 177 MMHG | TEMPERATURE: 97.8 F | RESPIRATION RATE: 18 BRPM | DIASTOLIC BLOOD PRESSURE: 87 MMHG | HEIGHT: 73 IN | HEART RATE: 62 BPM | BODY MASS INDEX: 29.82 KG/M2

## 2025-07-02 PROBLEM — R20.2 PARESTHESIA: Status: ACTIVE | Noted: 2025-07-02

## 2025-07-02 PROBLEM — E78.5 DYSLIPIDEMIA: Status: ACTIVE | Noted: 2025-07-02

## 2025-07-02 LAB
CHOLEST SERPL-MCNC: 125 MG/DL (ref 0–199)
ECHO AO ASC DIAM: 3.9 CM
ECHO AO ASCENDING AORTA INDEX: 1.73 CM/M2
ECHO AO ROOT DIAM: 3.2 CM
ECHO AO ROOT INDEX: 1.42 CM/M2
ECHO AV AREA PEAK VELOCITY: 2.8 CM2
ECHO AV AREA VTI: 3 CM2
ECHO AV AREA/BSA PEAK VELOCITY: 1.2 CM2/M2
ECHO AV AREA/BSA VTI: 1.3 CM2/M2
ECHO AV MEAN GRADIENT: 3 MMHG
ECHO AV MEAN GRADIENT: 3 MMHG
ECHO AV MEAN VELOCITY: 0.9 M/S
ECHO AV PEAK GRADIENT: 6 MMHG
ECHO AV PEAK VELOCITY: 1.2 M/S
ECHO AV VELOCITY RATIO: 0.83
ECHO AV VTI: 25.2 CM
ECHO BSA: 2.29 M2
ECHO LA AREA 2C: 16.5 CM2
ECHO LA AREA 4C: 18.7 CM2
ECHO LA DIAMETER INDEX: 1.73 CM/M2
ECHO LA MAJOR AXIS: 5.7 CM
ECHO LA MINOR AXIS: 5.1 CM
ECHO LA TO AORTIC ROOT RATIO: 1.22
ECHO LA VOL BP: 49 ML (ref 18–58)
ECHO LA VOL MOD A2C: 44 ML (ref 18–58)
ECHO LA VOL MOD A4C: 49 ML (ref 18–58)
ECHO LA VOL/BSA BIPLANE: 22 ML/M2 (ref 16–34)
ECHO LA VOLUME INDEX MOD A2C: 19 ML/M2 (ref 16–34)
ECHO LA VOLUME INDEX MOD A4C: 22 ML/M2 (ref 16–34)
ECHO LV E' LATERAL VELOCITY: 11.6 CM/S
ECHO LV E' SEPTAL VELOCITY: 7.4 CM/S
ECHO LV EDV A2C: 97 ML
ECHO LV EDV A4C: 148 ML
ECHO LV EDV INDEX A4C: 65 ML/M2
ECHO LV EDV NDEX A2C: 43 ML/M2
ECHO LV EF PHYSICIAN: 65 %
ECHO LV EJECTION FRACTION A2C: 53 %
ECHO LV EJECTION FRACTION A4C: 71 %
ECHO LV EJECTION FRACTION BIPLANE: 65 % (ref 55–100)
ECHO LV ESV A2C: 46 ML
ECHO LV ESV A4C: 42 ML
ECHO LV ESV INDEX A2C: 20 ML/M2
ECHO LV ESV INDEX A4C: 19 ML/M2
ECHO LV FRACTIONAL SHORTENING: 33 % (ref 28–44)
ECHO LV INTERNAL DIMENSION DIASTOLIC: 4.6 CM (ref 4.2–5.9)
ECHO LV INTERNAL DIMENSION SYSTOLIC INDEX: 1.37 CM/M2
ECHO LV INTERNAL DIMENSION SYSTOLIC: 3.1 CM
ECHO LV IVSD: 1.1 CM (ref 0.6–1)
ECHO LV MASS 2D: 169.9 G (ref 88–224)
ECHO LV MASS INDEX 2D: 75.2 G/M2 (ref 49–115)
ECHO LV POSTERIOR WALL DIASTOLIC: 1 CM (ref 0.6–1)
ECHO LVOT AREA: 3.5 CM2
ECHO LVOT AV VTI INDEX: 0.87
ECHO LVOT DIAM: 2.1 CM
ECHO LVOT MEAN GRADIENT: 2 MMHG
ECHO LVOT PEAK GRADIENT: 4 MMHG
ECHO LVOT PEAK VELOCITY: 1 M/S
ECHO LVOT STROKE VOLUME INDEX: 33.5 ML/M2
ECHO LVOT VTI: 21.9 CM
ECHO MV E/A RATIO: 0.98
ECHO MV E/E' RATIO (AVERAGED): 9.85
ECHO MV E/E' SEPTAL: 12.03
ECHO PV MAX VELOCITY: 1.2 M/S
ECHO PV PEAK GRADIENT: 6 MMHG
ECHO RA AREA 4C: 13.4 CM2
ECHO RA END SYSTOLIC VOLUME APICAL 4 CHAMBER INDEX BSA: 12 ML/M2
ECHO RA VOLUME: 26 ML
ECHO RV BASAL DIMENSION: 2.7 CM
ECHO RV FREE WALL PEAK S': 11.5 CM/S
ECHO RV LONGITUDINAL DIMENSION: 7.7 CM
ECHO RV MID DIMENSION: 2 CM
ECHO RV TAPSE: 2.2 CM (ref 1.7–?)
EST. AVERAGE GLUCOSE BLD GHB EST-MCNC: 105.4 MG/DL
HBA1C MFR BLD: 5.3 %
HDLC SERPL-MCNC: 35 MG/DL (ref 40–60)
LDLC SERPL CALC-MCNC: 76 MG/DL
TRIGL SERPL-MCNC: 68 MG/DL (ref 0–150)
TSH SERPL DL<=0.005 MIU/L-ACNC: 1.74 UIU/ML (ref 0.27–4.2)
VLDLC SERPL CALC-MCNC: 14 MG/DL

## 2025-07-02 PROCEDURE — 97530 THERAPEUTIC ACTIVITIES: CPT

## 2025-07-02 PROCEDURE — 92610 EVALUATE SWALLOWING FUNCTION: CPT

## 2025-07-02 PROCEDURE — 2500000003 HC RX 250 WO HCPCS: Performed by: INTERNAL MEDICINE

## 2025-07-02 PROCEDURE — 92526 ORAL FUNCTION THERAPY: CPT

## 2025-07-02 PROCEDURE — 83036 HEMOGLOBIN GLYCOSYLATED A1C: CPT

## 2025-07-02 PROCEDURE — 99223 1ST HOSP IP/OBS HIGH 75: CPT | Performed by: PSYCHIATRY & NEUROLOGY

## 2025-07-02 PROCEDURE — 84443 ASSAY THYROID STIM HORMONE: CPT

## 2025-07-02 PROCEDURE — 36415 COLL VENOUS BLD VENIPUNCTURE: CPT

## 2025-07-02 PROCEDURE — APPNB30 APP NON BILLABLE TIME 0-30 MINS

## 2025-07-02 PROCEDURE — 97116 GAIT TRAINING THERAPY: CPT

## 2025-07-02 PROCEDURE — 93306 TTE W/DOPPLER COMPLETE: CPT | Performed by: INTERNAL MEDICINE

## 2025-07-02 PROCEDURE — 97165 OT EVAL LOW COMPLEX 30 MIN: CPT

## 2025-07-02 PROCEDURE — 80061 LIPID PANEL: CPT

## 2025-07-02 PROCEDURE — APPSS60 APP SPLIT SHARED TIME 46-60 MINUTES

## 2025-07-02 PROCEDURE — 6370000000 HC RX 637 (ALT 250 FOR IP): Performed by: INTERNAL MEDICINE

## 2025-07-02 PROCEDURE — 6360000004 HC RX CONTRAST MEDICATION: Performed by: INTERNAL MEDICINE

## 2025-07-02 PROCEDURE — C8929 TTE W OR WO FOL WCON,DOPPLER: HCPCS

## 2025-07-02 PROCEDURE — 97161 PT EVAL LOW COMPLEX 20 MIN: CPT

## 2025-07-02 RX ORDER — NIFEDIPINE 30 MG/1
30 TABLET, EXTENDED RELEASE ORAL DAILY
Qty: 90 TABLET | Refills: 1 | Status: SHIPPED | OUTPATIENT
Start: 2025-07-02

## 2025-07-02 RX ORDER — LOSARTAN POTASSIUM 25 MG/1
50 TABLET ORAL DAILY
Status: DISCONTINUED | OUTPATIENT
Start: 2025-07-02 | End: 2025-07-02 | Stop reason: HOSPADM

## 2025-07-02 RX ORDER — LOSARTAN POTASSIUM 50 MG/1
50 TABLET ORAL DAILY
Qty: 30 TABLET | Refills: 3 | Status: SHIPPED | OUTPATIENT
Start: 2025-07-03

## 2025-07-02 RX ORDER — ATORVASTATIN CALCIUM 80 MG/1
80 TABLET, FILM COATED ORAL NIGHTLY
Qty: 30 TABLET | Refills: 3 | Status: SHIPPED | OUTPATIENT
Start: 2025-07-02

## 2025-07-02 RX ORDER — ASPIRIN 81 MG/1
81 TABLET ORAL DAILY
Qty: 30 TABLET | Refills: 3 | Status: SHIPPED | OUTPATIENT
Start: 2025-07-03

## 2025-07-02 RX ADMIN — SULFUR HEXAFLUORIDE 2 ML: 60.7; .19; .19 INJECTION, POWDER, LYOPHILIZED, FOR SUSPENSION INTRAVENOUS; INTRAVESICAL at 11:11

## 2025-07-02 RX ADMIN — SODIUM CHLORIDE, PRESERVATIVE FREE 10 ML: 5 INJECTION INTRAVENOUS at 09:51

## 2025-07-02 RX ADMIN — LOSARTAN POTASSIUM 50 MG: 25 TABLET, FILM COATED ORAL at 09:51

## 2025-07-02 RX ADMIN — ASPIRIN 81 MG: 81 TABLET, DELAYED RELEASE ORAL at 09:51

## 2025-07-02 NOTE — DISCHARGE INSTRUCTIONS
Monitor blood pressures closely at home and follow-up with PCP to adjust medications.   66 yo M hx HfrEF, HTN, GERD, dyslipidemia, chronic intermittent leg pain, aortic stenosis, chronic venous stasis dermatitis with bilateral leg swelling, admitted to [facility] with b/l leg pain and swelling, transferred to [facility] for TAVR evaluation for severe AS, s/p LHC nonobstructive, with acute decompensated HFrEF exacerbation.

## 2025-07-02 NOTE — PROGRESS NOTES
[facility] - Inpatient Rehabilitation Department   Phone: [phone]    Occupational Therapy    [x] Initial Evaluation            [] Daily Treatment Note         [] Discharge Summary      Patient: Narendra Gomez   : 1955   MRN: [id number]   Date of Service:  2025    Admitting Diagnosis:  Acute CVA (cerebrovascular accident) (HCC)  Current Admission Summary: Per Dr. Patel's note on : \"Narendra Gomez is a 69 y.o. male with pmh of HTN, HLD presented from home with complaints of sudden onset left lower lip and hand numbness while in the shower this morning at ~7:45 AM. He immediately took 2 baby aspirin's but subsequently noted left hand weakness with clumsiness while buttoning his shirt thus was brought in for stroke evaluation. Symptoms had largely resolved by the he arrived to the ED. He denied any headache, visual disturbances, AMS, dizziness, difficulty swallowing or history of stroke or TIA.    In the ED, CT head showed no acute intracranial abnormality.  CTA head and neck showed no large vessel occlusion.  MRI brain MRI brain showed a subtle 2 cm focus of restricted diffusion within the right precentral gyrus consistent with hyperacute infarct.    MRI finding 25:   1. Subtle 2 cm focus of restricted diffusion within the right precentral   gyrus without corresponding FLAIR signal abnormality consistent with a   hyperacute infarct, with adjacent hyperintense M4 branch, likely a distal   arterial occlusion.   2. No acute intracranial hemorrhage.     Past Medical History:  has a past medical history of Arthritis, Hyperlipidemia, and Hypertension.  Past Surgical History:  has no past surgical history on file.    Discharge Recommendations: Narendra Gomez scored a 24/ on the AM-PAC ADL Inpatient form.  At this time, no further OT is recommended upon discharge due to performance with BADLs and functional mobility tasks being consistent with baseline.  Recommend patient returns to 
  Speech Language Pathology  [facility] - Inpatient Rehabilitation Services  [phone]  SLP Clinical Swallow Evaluation       Patient: Narendra Gomez   : 1955   MRN: [id number]      Evaluation Date: 2025      Admitting Dx: TIA (transient ischemic attack) [G45.9]  Paresthesia [R20.2]  Left hand weakness [R29.898]  Acute CVA (cerebrovascular accident) (HCC) [I63.9]  Pain: Denies                                  Recommendations      Recommended Diet and Intervention 2025:  Diet Solids Recommendation:  Regular texture diet  Liquid Consistency Recommendation:  Thin liquids  Recommended form of Meds: Meds whole with water         Compensatory strategies: Upright as possible with all PO intake     Discharge Recommendations:  No further follow-up appears indicated at this time.     History/Course of Treatment     H&P: Narendra Gomez is a 69 y.o. male that presents to the ER for evaluation of left face lower lip and left hand clumsiness, at 07 45.  Onset of his symptoms were sudden maximum 15 minutes percentage.  Resolved hand deficit possible very mild residual left face droop.  No headache.  No double vision.  No chest pain pressure no shortness breath.  No history of atrial fibrillation.  History of hyperlipidemia.  Retired .  NIH stroke scale is a 1 at most on arrival.     Imaging:    MRI:  IMPRESSION:  1. Subtle 2 cm focus of restricted diffusion within the right precentral  gyrus without corresponding FLAIR signal abnormality consistent with a  hyperacute infarct, with adjacent hyperintense M4 branch, likely a distal  arterial occlusion.  2. No acute intracranial hemorrhage.    History/Prior Level of Function:   Living Status: lives with their spouse  Prior Dysphagia History: No prior dysphagia history noted per chart review or per Pt report. Currently the Pt reports no further oral weakness and no noted difficulty with swallowing function  Prior Speech History: No prior speech 
Patient seen in ED, room 01.  Admission completed with the following exceptions:  4 Eyes Assessment, Immunizations, Vaccines, Rights and Responsibilities, Orientation to room, Plan of Care, Education/Learning Assessment and Education Plan, white board, height and weight, pain assessment and head to toe assessment.  Patient is alert and oriented X 4.  Patient lives in a two story home with his wife Lionel and 3 cats and parrot, and is being admitted for an acute CVA (cerebrovascular accident).     Home Medication reconciliation will be/has been completed by Pharmacy Staff.      All patient's and/or family's questions answered.   
Discharge: no DME required at discharge  Precautions/Restrictions: medium fall risk  Weight Bearing Restrictions: no restrictions  [] Right Upper Extremity  [] Left Upper Extremity [] Right Lower Extremity  [] Left Lower Extremity     Required Braces/Orthotics: no braces required   [] Right  [] Left  Positional Restrictions:no positional restrictions    Pre-Admission Information   Lives With: spouse 1 bird, 3 cats   Type of Home: house  Home Layout: two level, laundry and pet care equipment in basement  Home Access: 2 step to enter with handrail.  Handrails are located on R side.  Bathroom Layout: walker accessible, tub/shower unit  Bathroom Equipment: N/A  Toilet Height: standard height  Home Equipment: reacher  Transfer Assistance: Independent without use of device  Ambulation Assistance:Independent without use of device  ADL Assistance: independent with all ADL's  IADL Assistance: independent with homemaking tasks  Active :        [x] Yes  [] No  Hand Dominance: [] Left  [x] Right  Current Employment: retired.  Occupation: middle management. Retired ~7 years ago.  Hobbies: hiking, gardening, sculpting, painting, photography, pets, camping, travel.  Recent Falls: 1 fall ~1 month ago, tripped over camper cover    Available Assistance at Discharge: 24 hr physical assistance available    Examination   Vision:   Vision Corrective Device: wears glasses at all times  Hearing:   WFL  Observation:   General Observation:  Pt on RA, no PIV  Posture:   Fair  Sensation:   denies numbness and tingling    Tone:   Normotonic  Coordination Testing:   Finger to Nose: WFL  Heel to Shin: WFL  Alternating Toe Tapping: WFL    ROM:   (B) LE AROM WFL  Strength:   (B) LE strength grossly WFL  Therapist Clinical Decision Making (Complexity): low complexity  Clinical Presentation: stable      Subjective  General: Pt found supine in bed upon arrival; agreeable to PT/OT eval. Wife present in room throughout session. Pt reports he

## 2025-07-02 NOTE — DISCHARGE SUMMARY
V2.0  Discharge Summary    Name:  Narendra Gomez /Age/Sex: 1955 (69 y.o. male)   Admit Date: 2025  Discharge Date: 25    MRN & CSN:  [id number] & [id number] Encounter Date and Time 25 12:49 PM EDT    Attending:  Silvia Patel MD Discharging Provider: Silvia Patel MD       Hospital Course:     Brief HPI: Narendra Gomez is a 69 y.o. male with pmh of HTN, HLD presented from home with complaints of sudden onset left lower lip and hand numbness.  Workup revealed an acute stroke.    Acute CVA:  CT head showed no acute intracranial abnormality.  CTA head and neck showed no large vessel occlusion.  MRI brain: subtle 2 cm focus of restricted diffusion within the right precentral  gyrus without corresponding FLAIR signal abnormality consistent with a  hyperacute infarct, with adjacent hyperintense M4 branch, likely a distal  arterial occlusion.  TTE 25: LVEF 65%.  Negative bubble study.  A1c 5.3% 25.  LDL 76, HDL 35  No arrhythmias noted on telemetry  EKG unremarkable.  PT/OT recommending no further therapy.  Neurology consulted;   PT OT, SLP evaluation     Uncontrolled HTN:   Started on nifedipine 30 mg daily.  Losartan increased to 50 mg daily with outpatient follow-up with PCP.      The patient expressed appropriate understanding of, and agreement with the discharge recommendations, medications, and plan.     Consults this admission:  IP CONSULT TO NEUROLOGY    Discharge Diagnosis:   Acute CVA (cerebrovascular accident) (HCC)      Discharge Instruction:   Follow up appointments: PCP  Primary care physician: Emma Bates APRN - NP within 1 week  Diet: low fat, low cholesterol diet   Activity: activity as tolerated  Disposition: Discharged to:   [x]Home, []C, []SNF, []Acute Rehab, []Hospice   Condition on discharge: Stable  Labs and Tests to be Followed up as an outpatient by PCP or Specialist: BP monitoring    Discharge Medications:        Medication List        START

## 2025-07-02 NOTE — CARE COORDINATION
Discharge Planning Note:    Chart reviewed and it appears that patient has minimal needs for discharge at this time. Risk Score 5 %     Primary Care Physician is Emma Bates APRN - NP    Primary insurance is [employer]    Please notify case management if any discharge needs are identified.      Case management will continue to follow progress and update discharge plan as needed.    Electronically signed by Otoinel Pablo on 7/2/2025 at 8:16 AM

## 2025-07-02 NOTE — PLAN OF CARE
Problem: Chronic Conditions and Co-morbidities  Goal: Patient's chronic conditions and co-morbidity symptoms are monitored and maintained or improved  7/2/2025 0105 by Caren Harrison, RN  Outcome: Progressing     Problem: Discharge Planning  Goal: Discharge to home or other facility with appropriate resources  7/2/2025 0105 by Caren Harrison, RN  Outcome: Progressing     Problem: Safety - Adult  Goal: Free from fall injury  Outcome: Progressing

## 2025-07-02 NOTE — DISCHARGE SUMMARY
Data- discharge order received, pt verbalized agreement to discharge, disposition to previous residence, no needs for HHC/DME.     Action- discharge instructions prepared and given to patient and spouse, pt verbalized understanding. Medication information packet given r/t NEW and/or CHANGED prescriptions emphasizing name/purpose/side effects, pt verbalized understanding. Discharge instruction summary: Diet- regular, Activity- as tolerated, Primary Care Physician as follows: Emma Bates APRN - [phone] f/u appointment in 1 week,prescription medications filled at preferred pharmacy.     1. WEIGHT: Admit Weight - Scale: 102.1 kg (225 lb) (07/01/25 0831)        Today  Weight - Scale: 102.1 kg (225 lb) (07/02/25 1039)       2. O2 SAT.: SpO2: 95 % (07/02/25 1209)    Response- Pt belongings gathered, IV removed. Disposition is home (no HHC/DME needs), transported with spouse, ambulated to [facility] w/ spouse, no complications.

## 2025-07-02 NOTE — CONSULTS
pressure closely at home, and keep blood pressure diary  PT and OT   Speech   DVT and GI prophylaxis  Neurochecks  Telemetry  Stroke education and prevention was discussed with the patient and family  Discharge planning after above workup and when medically stable  Discussed with family    Thank you for referring such patient. If you have any questions regarding my consult note, please don't hesitate to call me.     LACHO Jewell - CNP    Attending Supervising Physician's Attestation Statement      The patient was seen 7/2/2025 in conjunction with the nurse practitioner with independent history, evaluation and examination. I agree with the note which has been adjusted to reflect my findings, with the addition of the following:         The patient is 69 y.o.  male  who was admitted for   acute left-sided weakness.  Onset was 2 days ago.  Degree was severe persistent.  He came to the hospital.  Further imaging with CT showed no LVO.  MRI showed small right frontal stroke.  Today he feels better.  Almost back to his baseline    On examination:  No acute distress  Awake and alert x3.  Fluent speech.  Appears appropriate with intact recent and remote memory.  Pupil reactive and symmetric, extraocular motor intact, no ophthalmoplegia, face is symmetric and tongue is midline  No focal weakness with symmetric DTR  Normal tone  No sensory disturbance or abnormal movement    Impression:  Acute left-sided weakness secondary to new ischemic right frontal stroke, thromboembolic versus cardioembolic  Hypertension and hyperlipidemia      Recommendation:  Aspirin 81  Statin 80 mg daily  Lipid panel  A1c  Echo  Will need event monitor for 1 month to exclude cardioembolic source  PT and OT  Blood pressure diary at home  Continue Cozaar 50 mg daily  Stroke education provided including risk of recurrence  Follow-up with PCP for further recommendation  DC planning      Electronically signed by Gerardo Gomez MD on 7/2/25 at

## 2025-07-02 NOTE — PLAN OF CARE
Problem: Chronic Conditions and Co-morbidities  Goal: Patient's chronic conditions and co-morbidity symptoms are monitored and maintained or improved  Outcome: Adequate for Discharge  Flowsheets (Taken 7/2/2025 2026)  Care Plan - Patient's Chronic Conditions and Co-Morbidity Symptoms are Monitored and Maintained or Improved: Monitor and assess patient's chronic conditions and comorbid symptoms for stability, deterioration, or improvement     Problem: Discharge Planning  Goal: Discharge to home or other facility with appropriate resources  Outcome: Adequate for Discharge  Flowsheets (Taken 7/2/2025 4373)  Discharge to home or other facility with appropriate resources: Identify barriers to discharge with patient and caregiver     Problem: Safety - Adult  Goal: Free from fall injury  Outcome: Adequate for Discharge

## 2025-07-03 ENCOUNTER — TELEPHONE (OUTPATIENT)
Dept: FAMILY MEDICINE CLINIC | Age: 70
End: 2025-07-03

## 2025-07-03 NOTE — TELEPHONE ENCOUNTER
Care Transitions Initial Follow Up Call    Outreach made within 2 business days of discharge: Yes    Patient: Narendra Gomez Patient : 1955   MRN: [id number]  Reason for Admission: TIA  Discharge Date: 25       Spoke with: patient -Narendra Gomez    Discharge department/facility: [facility] Interactive Patient Contact:  Was patient able to fill all prescriptions: Yes  Was patient instructed to bring all medications to the follow-up visit: Yes  Is patient taking all medications as directed in the discharge summary? Yes  Does patient understand their discharge instructions: Yes  Does patient have questions or concerns that need addressed prior to 7-14 day follow up office visit: No    Additional needs identified to be addressed with provider  No needs identified Patient states he is Taking his B/P in the morning before taking his medication and then again in the afternoon after lunch to check the efficacy of his B/P medication. Patient is going to bring these readings to his follow up appointment.               Scheduled appointment with PCP within 7-14 days: Yes 25    Follow Up  Future Appointments   Date Time Provider Department Center   12/10/2025  9:30 AM [facility]       Marium Sanchez MA

## 2025-07-08 ENCOUNTER — OFFICE VISIT (OUTPATIENT)
Dept: FAMILY MEDICINE CLINIC | Age: 70
End: 2025-07-08
Payer: COMMERCIAL

## 2025-07-08 VITALS
SYSTOLIC BLOOD PRESSURE: 122 MMHG | WEIGHT: 220 LBS | TEMPERATURE: 98.2 F | HEART RATE: 70 BPM | DIASTOLIC BLOOD PRESSURE: 80 MMHG | OXYGEN SATURATION: 98 % | BODY MASS INDEX: 29.03 KG/M2

## 2025-07-08 DIAGNOSIS — G45.9 TIA (TRANSIENT ISCHEMIC ATTACK): Primary | ICD-10-CM

## 2025-07-08 DIAGNOSIS — Z09 HOSPITAL DISCHARGE FOLLOW-UP: ICD-10-CM

## 2025-07-08 PROCEDURE — 3074F SYST BP LT 130 MM HG: CPT | Performed by: NURSE PRACTITIONER

## 2025-07-08 PROCEDURE — 1123F ACP DISCUSS/DSCN MKR DOCD: CPT | Performed by: NURSE PRACTITIONER

## 2025-07-08 PROCEDURE — 1160F RVW MEDS BY RX/DR IN RCRD: CPT | Performed by: NURSE PRACTITIONER

## 2025-07-08 PROCEDURE — 99214 OFFICE O/P EST MOD 30 MIN: CPT | Performed by: NURSE PRACTITIONER

## 2025-07-08 PROCEDURE — 3079F DIAST BP 80-89 MM HG: CPT | Performed by: NURSE PRACTITIONER

## 2025-07-08 PROCEDURE — 1159F MED LIST DOCD IN RCRD: CPT | Performed by: NURSE PRACTITIONER

## 2025-07-08 RX ORDER — NIFEDIPINE 30 MG/1
TABLET, EXTENDED RELEASE ORAL
Qty: 90 TABLET | Refills: 1 | Status: SHIPPED
Start: 2025-07-08

## 2025-07-08 ASSESSMENT — PATIENT HEALTH QUESTIONNAIRE - PHQ9
SUM OF ALL RESPONSES TO PHQ QUESTIONS 1-9: 0
2. FEELING DOWN, DEPRESSED OR HOPELESS: NOT AT ALL
SUM OF ALL RESPONSES TO PHQ QUESTIONS 1-9: 0
1. LITTLE INTEREST OR PLEASURE IN DOING THINGS: NOT AT ALL
SUM OF ALL RESPONSES TO PHQ QUESTIONS 1-9: 0
SUM OF ALL RESPONSES TO PHQ QUESTIONS 1-9: 0

## 2025-07-08 NOTE — PROGRESS NOTES
Normal wall motion. Normal diastolic function.    Right Ventricle: Right ventricle size is normal. Normal systolic function.    Interatrial Septum: Agitated saline study was negative with and without provocation.    Aorta: Normal sized aortic root. Mildly dilated ascending aorta. Ao ascending diameter is 3.9 cm.  Medication changes included increasing losartan 50 mg, started nifedipine 30 mg for better BP control; started on 81 mg aspirin and increased lipitor 80 mg daily.      PT discharged home, reports symptoms resolved.  Pt provided list of BP readings, taking nifedipine in PM, BP's running lower- 101/64, 114/72, 118/69, 111/65.  Will decrease nifedipine 15 mg nightly.     Current Outpatient Medications on File Prior to Visit   Medication Sig Dispense Refill    aspirin 81 MG EC tablet Take 1 tablet by mouth daily 30 tablet 3    atorvastatin (LIPITOR) 80 MG tablet Take 1 tablet by mouth nightly 30 tablet 3    losartan (COZAAR) 50 MG tablet Take 1 tablet by mouth daily 30 tablet 3     No current facility-administered medications on file prior to visit.      No Known Allergies  Past Medical History:   Diagnosis Date    Arthritis     Hyperlipidemia     Hypertension July 2017      History reviewed. No pertinent surgical history.   Family History   Problem Relation Age of Onset    Diabetes Mother     Heart Disease Mother       Social History     Tobacco Use    Smoking status: Never    Smokeless tobacco: Never   Substance Use Topics    Alcohol use: Never        Review of Systems    Objective:    /80 (BP Site: Left Upper Arm, Patient Position: Sitting, BP Cuff Size: Medium Adult)   Pulse 70   Temp 98.2 °F (36.8 °C) (Infrared)   Wt 99.8 kg (220 lb)   SpO2 98%   BMI 29.03 kg/m²   Weight - Scale: 99.8 kg (220 lb)     BP Readings from Last 3 Encounters:   07/08/25 122/80   07/02/25 (!) 177/87   12/09/24 138/72     Wt Readings from Last 3 Encounters:   07/08/25 99.8 kg (220 lb)   07/02/25 102.1 kg (225 lb)

## 2025-08-06 RX ORDER — LOSARTAN POTASSIUM 50 MG/1
50 TABLET ORAL DAILY
Qty: 90 TABLET | Refills: 1 | Status: SHIPPED | OUTPATIENT
Start: 2025-08-06

## 2025-08-06 RX ORDER — ATORVASTATIN CALCIUM 80 MG/1
80 TABLET, FILM COATED ORAL NIGHTLY
Qty: 90 TABLET | Refills: 1 | Status: SHIPPED | OUTPATIENT
Start: 2025-08-06